# Patient Record
Sex: MALE | Race: WHITE | NOT HISPANIC OR LATINO | Employment: PART TIME | URBAN - METROPOLITAN AREA
[De-identification: names, ages, dates, MRNs, and addresses within clinical notes are randomized per-mention and may not be internally consistent; named-entity substitution may affect disease eponyms.]

---

## 2019-03-02 ENCOUNTER — HOSPITAL ENCOUNTER (INPATIENT)
Facility: MEDICAL CENTER | Age: 59
LOS: 2 days | DRG: 189 | End: 2019-03-04
Attending: EMERGENCY MEDICINE | Admitting: INTERNAL MEDICINE
Payer: MEDICAID

## 2019-03-02 ENCOUNTER — APPOINTMENT (OUTPATIENT)
Dept: RADIOLOGY | Facility: MEDICAL CENTER | Age: 59
DRG: 189 | End: 2019-03-02
Attending: STUDENT IN AN ORGANIZED HEALTH CARE EDUCATION/TRAINING PROGRAM
Payer: MEDICAID

## 2019-03-02 ENCOUNTER — APPOINTMENT (OUTPATIENT)
Dept: RADIOLOGY | Facility: MEDICAL CENTER | Age: 59
DRG: 189 | End: 2019-03-02
Attending: EMERGENCY MEDICINE
Payer: MEDICAID

## 2019-03-02 DIAGNOSIS — R06.00 DYSPNEA, UNSPECIFIED TYPE: ICD-10-CM

## 2019-03-02 DIAGNOSIS — R09.02 HYPOXIA: ICD-10-CM

## 2019-03-02 PROBLEM — T78.3XXA ANGIOEDEMA: Status: ACTIVE | Noted: 2019-03-02

## 2019-03-02 PROBLEM — J96.01 ACUTE RESPIRATORY FAILURE WITH HYPOXIA (HCC): Status: ACTIVE | Noted: 2019-03-02

## 2019-03-02 PROBLEM — I10 HYPERTENSION: Status: ACTIVE | Noted: 2019-03-02

## 2019-03-02 LAB
ALBUMIN SERPL BCP-MCNC: 4.7 G/DL (ref 3.2–4.9)
ALBUMIN/GLOB SERPL: 1.5 G/DL
ALP SERPL-CCNC: 77 U/L (ref 30–99)
ALT SERPL-CCNC: 29 U/L (ref 2–50)
ANION GAP SERPL CALC-SCNC: 9 MMOL/L (ref 0–11.9)
AST SERPL-CCNC: 16 U/L (ref 12–45)
BASOPHILS # BLD AUTO: 0.5 % (ref 0–1.8)
BASOPHILS # BLD: 0.08 K/UL (ref 0–0.12)
BILIRUB SERPL-MCNC: 0.7 MG/DL (ref 0.1–1.5)
BNP SERPL-MCNC: 3 PG/ML (ref 0–100)
BUN SERPL-MCNC: 19 MG/DL (ref 8–22)
C4 SERPL-MCNC: 35 MG/DL (ref 19–52)
CALCIUM SERPL-MCNC: 10.2 MG/DL (ref 8.5–10.5)
CHLORIDE SERPL-SCNC: 101 MMOL/L (ref 96–112)
CO2 SERPL-SCNC: 26 MMOL/L (ref 20–33)
CREAT SERPL-MCNC: 0.93 MG/DL (ref 0.5–1.4)
CRP SERPL HS-MCNC: 1.15 MG/DL (ref 0–0.75)
EKG IMPRESSION: NORMAL
EOSINOPHIL # BLD AUTO: 0.29 K/UL (ref 0–0.51)
EOSINOPHIL NFR BLD: 2 % (ref 0–6.9)
ERYTHROCYTE [DISTWIDTH] IN BLOOD BY AUTOMATED COUNT: 42.5 FL (ref 35.9–50)
ERYTHROCYTE [SEDIMENTATION RATE] IN BLOOD BY WESTERGREN METHOD: 19 MM/HOUR (ref 0–20)
FLUAV RNA SPEC QL NAA+PROBE: NEGATIVE
FLUBV RNA SPEC QL NAA+PROBE: NEGATIVE
GLOBULIN SER CALC-MCNC: 3.2 G/DL (ref 1.9–3.5)
GLUCOSE SERPL-MCNC: 114 MG/DL (ref 65–99)
HCT VFR BLD AUTO: 44.5 % (ref 42–52)
HGB BLD-MCNC: 15.7 G/DL (ref 14–18)
IMM GRANULOCYTES # BLD AUTO: 0.06 K/UL (ref 0–0.11)
IMM GRANULOCYTES NFR BLD AUTO: 0.4 % (ref 0–0.9)
LYMPHOCYTES # BLD AUTO: 2.11 K/UL (ref 1–4.8)
LYMPHOCYTES NFR BLD: 14.5 % (ref 22–41)
MCH RBC QN AUTO: 33.2 PG (ref 27–33)
MCHC RBC AUTO-ENTMCNC: 35.3 G/DL (ref 33.7–35.3)
MCV RBC AUTO: 94.1 FL (ref 81.4–97.8)
MONOCYTES # BLD AUTO: 0.88 K/UL (ref 0–0.85)
MONOCYTES NFR BLD AUTO: 6 % (ref 0–13.4)
NEUTROPHILS # BLD AUTO: 11.16 K/UL (ref 1.82–7.42)
NEUTROPHILS NFR BLD: 76.6 % (ref 44–72)
NRBC # BLD AUTO: 0 K/UL
NRBC BLD-RTO: 0 /100 WBC
PLATELET # BLD AUTO: 211 K/UL (ref 164–446)
PMV BLD AUTO: 10.2 FL (ref 9–12.9)
POTASSIUM SERPL-SCNC: 4 MMOL/L (ref 3.6–5.5)
PROCALCITONIN SERPL-MCNC: <0.05 NG/ML
PROT SERPL-MCNC: 7.9 G/DL (ref 6–8.2)
RBC # BLD AUTO: 4.73 M/UL (ref 4.7–6.1)
SODIUM SERPL-SCNC: 136 MMOL/L (ref 135–145)
TROPONIN I SERPL-MCNC: <0.01 NG/ML (ref 0–0.04)
TSH SERPL DL<=0.005 MIU/L-ACNC: 2.05 UIU/ML (ref 0.38–5.33)
WBC # BLD AUTO: 14.6 K/UL (ref 4.8–10.8)

## 2019-03-02 PROCEDURE — 700111 HCHG RX REV CODE 636 W/ 250 OVERRIDE (IP): Performed by: STUDENT IN AN ORGANIZED HEALTH CARE EDUCATION/TRAINING PROGRAM

## 2019-03-02 PROCEDURE — 99285 EMERGENCY DEPT VISIT HI MDM: CPT

## 2019-03-02 PROCEDURE — 302128 INFUSION PUMP: Performed by: EMERGENCY MEDICINE

## 2019-03-02 PROCEDURE — 36415 COLL VENOUS BLD VENIPUNCTURE: CPT

## 2019-03-02 PROCEDURE — 770020 HCHG ROOM/CARE - TELE (206)

## 2019-03-02 PROCEDURE — 86160 COMPLEMENT ANTIGEN: CPT

## 2019-03-02 PROCEDURE — 70490 CT SOFT TISSUE NECK W/O DYE: CPT

## 2019-03-02 PROCEDURE — 94640 AIRWAY INHALATION TREATMENT: CPT

## 2019-03-02 PROCEDURE — 700105 HCHG RX REV CODE 258: Performed by: STUDENT IN AN ORGANIZED HEALTH CARE EDUCATION/TRAINING PROGRAM

## 2019-03-02 PROCEDURE — 83880 ASSAY OF NATRIURETIC PEPTIDE: CPT

## 2019-03-02 PROCEDURE — 94760 N-INVAS EAR/PLS OXIMETRY 1: CPT

## 2019-03-02 PROCEDURE — 93005 ELECTROCARDIOGRAM TRACING: CPT

## 2019-03-02 PROCEDURE — 84145 PROCALCITONIN (PCT): CPT

## 2019-03-02 PROCEDURE — 700102 HCHG RX REV CODE 250 W/ 637 OVERRIDE(OP): Performed by: STUDENT IN AN ORGANIZED HEALTH CARE EDUCATION/TRAINING PROGRAM

## 2019-03-02 PROCEDURE — 96374 THER/PROPH/DIAG INJ IV PUSH: CPT

## 2019-03-02 PROCEDURE — 84484 ASSAY OF TROPONIN QUANT: CPT

## 2019-03-02 PROCEDURE — 71275 CT ANGIOGRAPHY CHEST: CPT

## 2019-03-02 PROCEDURE — 87502 INFLUENZA DNA AMP PROBE: CPT

## 2019-03-02 PROCEDURE — 304561 HCHG STAT O2

## 2019-03-02 PROCEDURE — 71045 X-RAY EXAM CHEST 1 VIEW: CPT

## 2019-03-02 PROCEDURE — 84443 ASSAY THYROID STIM HORMONE: CPT

## 2019-03-02 PROCEDURE — 93005 ELECTROCARDIOGRAM TRACING: CPT | Performed by: EMERGENCY MEDICINE

## 2019-03-02 PROCEDURE — 700117 HCHG RX CONTRAST REV CODE 255: Performed by: STUDENT IN AN ORGANIZED HEALTH CARE EDUCATION/TRAINING PROGRAM

## 2019-03-02 PROCEDURE — 302128 INFUSION PUMP

## 2019-03-02 PROCEDURE — A9270 NON-COVERED ITEM OR SERVICE: HCPCS | Performed by: STUDENT IN AN ORGANIZED HEALTH CARE EDUCATION/TRAINING PROGRAM

## 2019-03-02 PROCEDURE — 700111 HCHG RX REV CODE 636 W/ 250 OVERRIDE (IP): Performed by: EMERGENCY MEDICINE

## 2019-03-02 PROCEDURE — 85652 RBC SED RATE AUTOMATED: CPT

## 2019-03-02 PROCEDURE — 82103 ALPHA-1-ANTITRYPSIN TOTAL: CPT

## 2019-03-02 PROCEDURE — 86140 C-REACTIVE PROTEIN: CPT

## 2019-03-02 PROCEDURE — 700101 HCHG RX REV CODE 250: Performed by: STUDENT IN AN ORGANIZED HEALTH CARE EDUCATION/TRAINING PROGRAM

## 2019-03-02 PROCEDURE — 80053 COMPREHEN METABOLIC PANEL: CPT

## 2019-03-02 PROCEDURE — 700101 HCHG RX REV CODE 250: Performed by: EMERGENCY MEDICINE

## 2019-03-02 PROCEDURE — 85025 COMPLETE CBC W/AUTO DIFF WBC: CPT

## 2019-03-02 RX ORDER — FAMOTIDINE 20 MG/1
10 TABLET, FILM COATED ORAL ONCE
Status: COMPLETED | OUTPATIENT
Start: 2019-03-02 | End: 2019-03-02

## 2019-03-02 RX ORDER — LOSARTAN POTASSIUM 25 MG/1
25 TABLET ORAL DAILY
Status: ON HOLD | COMMUNITY
End: 2019-03-04

## 2019-03-02 RX ORDER — IPRATROPIUM BROMIDE AND ALBUTEROL SULFATE 2.5; .5 MG/3ML; MG/3ML
3 SOLUTION RESPIRATORY (INHALATION)
Status: DISCONTINUED | OUTPATIENT
Start: 2019-03-02 | End: 2019-03-03

## 2019-03-02 RX ORDER — METHYLPREDNISOLONE SODIUM SUCCINATE 125 MG/2ML
125 INJECTION, POWDER, LYOPHILIZED, FOR SOLUTION INTRAMUSCULAR; INTRAVENOUS EVERY 8 HOURS
Status: DISCONTINUED | OUTPATIENT
Start: 2019-03-02 | End: 2019-03-04

## 2019-03-02 RX ORDER — CETIRIZINE HYDROCHLORIDE 10 MG/1
10 TABLET ORAL ONCE
Status: COMPLETED | OUTPATIENT
Start: 2019-03-02 | End: 2019-03-02

## 2019-03-02 RX ORDER — METHYLPREDNISOLONE SODIUM SUCCINATE 125 MG/2ML
62.5 INJECTION, POWDER, LYOPHILIZED, FOR SOLUTION INTRAMUSCULAR; INTRAVENOUS DAILY
Status: DISCONTINUED | OUTPATIENT
Start: 2019-03-03 | End: 2019-03-02

## 2019-03-02 RX ORDER — RANITIDINE 15 MG/ML
75 SOLUTION ORAL ONCE
Status: DISCONTINUED | OUTPATIENT
Start: 2019-03-02 | End: 2019-03-02

## 2019-03-02 RX ORDER — ACETAMINOPHEN 325 MG/1
650 TABLET ORAL EVERY 6 HOURS PRN
Status: DISCONTINUED | OUTPATIENT
Start: 2019-03-02 | End: 2019-03-04 | Stop reason: HOSPADM

## 2019-03-02 RX ORDER — AMOXICILLIN 250 MG
2 CAPSULE ORAL 2 TIMES DAILY
Status: DISCONTINUED | OUTPATIENT
Start: 2019-03-02 | End: 2019-03-04 | Stop reason: HOSPADM

## 2019-03-02 RX ORDER — ENALAPRILAT 1.25 MG/ML
1.25 INJECTION INTRAVENOUS EVERY 6 HOURS PRN
Status: DISCONTINUED | OUTPATIENT
Start: 2019-03-02 | End: 2019-03-02

## 2019-03-02 RX ORDER — CETIRIZINE HYDROCHLORIDE 10 MG/1
10 TABLET ORAL DAILY
Status: DISCONTINUED | OUTPATIENT
Start: 2019-03-02 | End: 2019-03-02

## 2019-03-02 RX ORDER — POLYETHYLENE GLYCOL 3350 17 G/17G
1 POWDER, FOR SOLUTION ORAL
Status: DISCONTINUED | OUTPATIENT
Start: 2019-03-02 | End: 2019-03-04 | Stop reason: HOSPADM

## 2019-03-02 RX ORDER — SODIUM CHLORIDE 9 MG/ML
INJECTION, SOLUTION INTRAVENOUS CONTINUOUS
Status: DISCONTINUED | OUTPATIENT
Start: 2019-03-02 | End: 2019-03-04 | Stop reason: HOSPADM

## 2019-03-02 RX ORDER — BISACODYL 10 MG
10 SUPPOSITORY, RECTAL RECTAL
Status: DISCONTINUED | OUTPATIENT
Start: 2019-03-02 | End: 2019-03-04 | Stop reason: HOSPADM

## 2019-03-02 RX ORDER — METHYLPREDNISOLONE SODIUM SUCCINATE 125 MG/2ML
125 INJECTION, POWDER, LYOPHILIZED, FOR SOLUTION INTRAMUSCULAR; INTRAVENOUS ONCE
Status: COMPLETED | OUTPATIENT
Start: 2019-03-02 | End: 2019-03-02

## 2019-03-02 RX ORDER — HYDRALAZINE HYDROCHLORIDE 20 MG/ML
10 INJECTION INTRAMUSCULAR; INTRAVENOUS EVERY 4 HOURS PRN
Status: DISCONTINUED | OUTPATIENT
Start: 2019-03-02 | End: 2019-03-04 | Stop reason: HOSPADM

## 2019-03-02 RX ADMIN — CETIRIZINE HYDROCHLORIDE 10 MG: 10 TABLET, FILM COATED ORAL at 14:37

## 2019-03-02 RX ADMIN — METHYLPREDNISOLONE SODIUM SUCCINATE 125 MG: 125 INJECTION, POWDER, FOR SOLUTION INTRAMUSCULAR; INTRAVENOUS at 09:50

## 2019-03-02 RX ADMIN — IOHEXOL 60 ML: 350 INJECTION, SOLUTION INTRAVENOUS at 14:20

## 2019-03-02 RX ADMIN — IPRATROPIUM BROMIDE AND ALBUTEROL SULFATE 3 ML: .5; 3 SOLUTION RESPIRATORY (INHALATION) at 23:04

## 2019-03-02 RX ADMIN — ENOXAPARIN SODIUM 40 MG: 100 INJECTION SUBCUTANEOUS at 17:30

## 2019-03-02 RX ADMIN — METHYLPREDNISOLONE SODIUM SUCCINATE 125 MG: 125 INJECTION, POWDER, FOR SOLUTION INTRAMUSCULAR; INTRAVENOUS at 17:29

## 2019-03-02 RX ADMIN — SODIUM CHLORIDE 1000 ML: 9 INJECTION, SOLUTION INTRAVENOUS at 17:34

## 2019-03-02 RX ADMIN — IPRATROPIUM BROMIDE 0.5 MG: 0.5 SOLUTION RESPIRATORY (INHALATION) at 10:00

## 2019-03-02 RX ADMIN — FAMOTIDINE 10 MG: 20 TABLET ORAL at 17:30

## 2019-03-02 RX ADMIN — ALBUTEROL SULFATE 2.5 MG: 2.5 SOLUTION RESPIRATORY (INHALATION) at 10:00

## 2019-03-02 RX ADMIN — RACEPINEPHRINE HYDROCHLORIDE 0.5 ML: 11.25 SOLUTION RESPIRATORY (INHALATION) at 19:34

## 2019-03-02 ASSESSMENT — ENCOUNTER SYMPTOMS
FEVER: 0
NAUSEA: 0
COUGH: 1
HEARTBURN: 0
ABDOMINAL PAIN: 0
DIZZINESS: 0
BACK PAIN: 0
VOMITING: 0
CHILLS: 0
ORTHOPNEA: 1
BLURRED VISION: 0
WHEEZING: 0
HEADACHES: 0
SHORTNESS OF BREATH: 1
SORE THROAT: 0
DIARRHEA: 0
PALPITATIONS: 0
DOUBLE VISION: 0

## 2019-03-02 ASSESSMENT — COGNITIVE AND FUNCTIONAL STATUS - GENERAL
MOBILITY SCORE: 24
SUGGESTED CMS G CODE MODIFIER MOBILITY: CH
DAILY ACTIVITIY SCORE: 24
SUGGESTED CMS G CODE MODIFIER DAILY ACTIVITY: CH

## 2019-03-02 ASSESSMENT — COPD QUESTIONNAIRES
DO YOU EVER COUGH UP ANY MUCUS OR PHLEGM?: NO/ONLY WITH OCCASIONAL COLDS OR INFECTIONS
DO YOU EVER COUGH UP ANY MUCUS OR PHLEGM?: NO/ONLY WITH OCCASIONAL COLDS OR INFECTIONS
DURING THE PAST 4 WEEKS HOW MUCH DID YOU FEEL SHORT OF BREATH: NONE/LITTLE OF THE TIME
HAVE YOU SMOKED AT LEAST 100 CIGARETTES IN YOUR ENTIRE LIFE: NO/DON'T KNOW
COPD SCREENING SCORE: 1
HAVE YOU SMOKED AT LEAST 100 CIGARETTES IN YOUR ENTIRE LIFE: NO/DON'T KNOW
IN THE PAST 12 MONTHS DO YOU DO LESS THAN YOU USED TO BECAUSE OF YOUR BREATHING PROBLEMS: DISAGREE/UNSURE
DURING THE PAST 4 WEEKS HOW MUCH DID YOU FEEL SHORT OF BREATH: NONE/LITTLE OF THE TIME
COPD SCREENING SCORE: 1

## 2019-03-02 ASSESSMENT — PATIENT HEALTH QUESTIONNAIRE - PHQ9
SUM OF ALL RESPONSES TO PHQ9 QUESTIONS 1 AND 2: 0
1. LITTLE INTEREST OR PLEASURE IN DOING THINGS: NOT AT ALL
2. FEELING DOWN, DEPRESSED, IRRITABLE, OR HOPELESS: NOT AT ALL

## 2019-03-02 ASSESSMENT — LIFESTYLE VARIABLES
EVER_SMOKED: YES
ALCOHOL_USE: NO
EVER_SMOKED: YES

## 2019-03-02 NOTE — ED TRIAGE NOTES
Chief Complaint   Patient presents with   • Shortness of Breath     and nonproductive cough starting last night     SpO2 86% in triage. Denies fever/chills/bodyaches or nausea. Recently switched from lisinopril to losartan and took first dose yesterday evening.

## 2019-03-02 NOTE — ASSESSMENT & PLAN NOTE
Blood pressure has been stable in the 120s systolic  At home, patient is in the Stage 2 to Stage 3 Range of Hypertension  Continue to monitor while inpatient.  Avoid ACE Inhibitors or ARBs for now.   Follow up outpatient with PCP for further chronic blood pressure management. May consider calcium channel blockers or thiazide diuretics.

## 2019-03-02 NOTE — ED NOTES
Med rec complete per pt at bedside   Allergies reviewed - patient states he think Lisinopril was causing him to cough but is unsure , got switched to Losartan last night (03/01/19)  No oral ABX within last 30 days

## 2019-03-02 NOTE — ED NOTES
Patient is awake and alert, responding to questions appropriately, patient states he was switched from Lisinopril to HCTZ and Losartan, started taking these medications yesterday and since then has been very short of breath, states he has dyspnea at rest as well as on exertion, states he is unable to lay down with out feeling very short of breath, states he has had a productive cough with white/clear sputum, hypoxic on RA with o2 sat of 87, denies chest pain, denies abdominal pain or NVD, denies urinary symptoms, negative for swelling in his lower extremities .Walked to ED room in [facility] with steady gait however patient was on 4 liters oxygen.

## 2019-03-02 NOTE — ED PROVIDER NOTES
"ED Provider Note    ED Provider Note    Primary care provider: Damien Perales D.O.  Means of arrival: POV   History obtained from: Patient  History limited by: None    CHIEF COMPLAINT  Chief Complaint   Patient presents with   • Shortness of Breath     and nonproductive cough starting last night       HPI  Asad Estevez is a 58 y.o. male who presents to the Emergency Department With a chief complaint of shortness of breath.  Patient states he originated with a cough after he was started on lisinopril few weeks ago.  Cough got progressively worse.  He follow-up with his primary care doctor yesterday who discontinued the lisinopril.  Apparently, his oxygen saturation was checked in the office and it was low approximately 88 which is concerning for his PCP.  This prompted him to be sent for an outpatient chest x-ray as well as lab work.  Patient was then prescribed losartan.  He took the first dose last night.  He went to lay down and again feeling short of breath.  He felt like perhaps his throat was closing up.  He could not lay flat this made his symptoms worse.  He continued to have a cough.  He states it was productive but only of saliva.  He went out to the living room in order to not bother his wife but he still could not lay flat.  Symptoms worsen which is very concerning for him.  This prompted his coming to the ED for evaluation.  He denies any lower extremity edema now or in the recent past.  He denies pain of any kind.  No headache.  No chest pain.  No back pain.  No fever.  No sore throat or ear pain.  He was told yesterday, that his x-ray was \"clear\" he does not have results of the lab work done.  Patient's never had similar symptoms in the past.  His treatment for hypertension only started a few weeks ago.  Patient denies any known recent exposures to chemicals.  He works for [employer].  He has no history of thromboembolic disease.  His father  of hemochromatosis.    REVIEW OF SYSTEMS  Review " "of Systems   Constitutional: Negative for chills and fever.   HENT: Negative for congestion and sore throat.    Respiratory: Positive for cough and shortness of breath.    Cardiovascular: Positive for orthopnea. Negative for chest pain, palpitations and leg swelling.   Gastrointestinal: Negative for abdominal pain, diarrhea, nausea and vomiting.   Genitourinary: Negative for dysuria.   Musculoskeletal: Negative for back pain.   Neurological: Negative for headaches.   All other systems reviewed and are negative.      PAST MEDICAL HISTORY   has a past medical history of Hypertension.    SURGICAL HISTORY   has a past surgical history that includes other abdominal surgery.    SOCIAL HISTORY  Social History   Substance Use Topics   • Smoking status: Former Smoker     Packs/day: 0.50     Years: 25.00     Types: Cigarettes   • Smokeless tobacco: Never Used      Comment: 15 yrs. 1/2 ppd   • Alcohol use Yes      Comment: few beers a week      History   Drug Use No       FAMILY HISTORY  History reviewed. No pertinent family history.    CURRENT MEDICATIONS  Home Medications     Reviewed by Lucia Quiroz, Pharmacy Intern (Pharmacy Intern) on 03/02/19 at 1106  Med List Status: Complete   Medication Last Dose Status   losartan (COZAAR) 25 MG Tab 3/1/2019 Active                ALLERGIES  Allergies   Allergen Reactions   • Nkda [No Known Drug Allergy]        PHYSICAL EXAM  VITAL SIGNS: /85   Pulse 95   Temp 36.3 °C (97.3 °F) (Temporal)   Resp 14   Ht 1.854 m (6' 1\")   Wt (!) 134.6 kg (296 lb 11.8 oz)   SpO2 93%   BMI 39.15 kg/m²   Vitals reviewed.  Constitutional: Patient is oriented to person, place, and time. Appears well-developed and well-nourished. No distress.  Obese male.  Head: Normocephalic and atraumatic.   Ears: Normal external ears bilaterally.   Mouth/Throat: Oropharynx is clear and moist, no exudates.   Eyes: Conjunctivae are normal. Pupils are equal, round, and reactive to light.   Neck: Normal range " of motion. Neck supple.  Cardiovascular: Normal rate, regular rhythm and normal heart sounds. Normal peripheral pulses.  Pulmonary/Chest: Diminished breath sounds.  Effort normal and breath sounds normal. No respiratory distress, no wheezes, rhonchi, or rales. No chest wall tenderness.  Abdominal: Soft. Bowel sounds are normal. There is no tenderness. No rebound or guarding, or peritoneal signs.   Musculoskeletal: No edema and no tenderness.   Lymphadenopathy: No cervical adenopathy.   Neurological: No focal deficits.   Skin: Skin is warm and dry. No erythema. No pallor.   Psychiatric: Patient has a normal mood and affect.     LABS  Results for orders placed or performed during the hospital encounter of 03/02/19   CBC WITH DIFFERENTIAL   Result Value Ref Range    WBC 14.6 (H) 4.8 - 10.8 K/uL    RBC 4.73 4.70 - 6.10 M/uL    Hemoglobin 15.7 14.0 - 18.0 g/dL    Hematocrit 44.5 42.0 - 52.0 %    MCV 94.1 81.4 - 97.8 fL    MCH 33.2 (H) 27.0 - 33.0 pg    MCHC 35.3 33.7 - 35.3 g/dL    RDW 42.5 35.9 - 50.0 fL    Platelet Count 211 164 - 446 K/uL    MPV 10.2 9.0 - 12.9 fL    Neutrophils-Polys 76.60 (H) 44.00 - 72.00 %    Lymphocytes 14.50 (L) 22.00 - 41.00 %    Monocytes 6.00 0.00 - 13.40 %    Eosinophils 2.00 0.00 - 6.90 %    Basophils 0.50 0.00 - 1.80 %    Immature Granulocytes 0.40 0.00 - 0.90 %    Nucleated RBC 0.00 /100 WBC    Neutrophils (Absolute) 11.16 (H) 1.82 - 7.42 K/uL    Lymphs (Absolute) 2.11 1.00 - 4.80 K/uL    Monos (Absolute) 0.88 (H) 0.00 - 0.85 K/uL    Eos (Absolute) 0.29 0.00 - 0.51 K/uL    Baso (Absolute) 0.08 0.00 - 0.12 K/uL    Immature Granulocytes (abs) 0.06 0.00 - 0.11 K/uL    NRBC (Absolute) 0.00 K/uL   COMP METABOLIC PANEL   Result Value Ref Range    Sodium 136 135 - 145 mmol/L    Potassium 4.0 3.6 - 5.5 mmol/L    Chloride 101 96 - 112 mmol/L    Co2 26 20 - 33 mmol/L    Anion Gap 9.0 0.0 - 11.9    Glucose 114 (H) 65 - 99 mg/dL    Bun 19 8 - 22 mg/dL    Creatinine 0.93 0.50 - 1.40 mg/dL    Calcium  10.2 8.5 - 10.5 mg/dL    AST(SGOT) 16 12 - 45 U/L    ALT(SGPT) 29 2 - 50 U/L    Alkaline Phosphatase 77 30 - 99 U/L    Total Bilirubin 0.7 0.1 - 1.5 mg/dL    Albumin 4.7 3.2 - 4.9 g/dL    Total Protein 7.9 6.0 - 8.2 g/dL    Globulin 3.2 1.9 - 3.5 g/dL    A-G Ratio 1.5 g/dL   TROPONIN   Result Value Ref Range    Troponin I <0.01 0.00 - 0.04 ng/mL   BNP   Result Value Ref Range    B Natriuretic Peptide 3 0 - 100 pg/mL   ESTIMATED GFR   Result Value Ref Range    GFR If African American >60 >60 mL/min/1.73 m 2    GFR If Non African American >60 >60 mL/min/1.73 m 2   EKG   Result Value Ref Range    Report       [facility] Emergency Dept.    Test Date:  2019  Pt Name:    MELANIE HANSEN              Department: ER  MRN:        [id number]                      Room:  Gender:     Male                         Technician: [id number]  :        1960                   Requested By:ER TRIAGE PROTOCOL  Order #:    [id number]                    Reading MD:    Measurements  Intervals                                Axis  Rate:       86                           P:          60  CT:         168                          QRS:        43  QRSD:       88                           T:          60  QT:         380  QTc:        455    Interpretive Statements  SINUS RHYTHM  BORDERLINE LOW VOLTAGE IN FRONTAL LEADS  Compared to ECG 2013 09:47:29  No significant changes         All labs reviewed by me.    EKG Interpretation  Interpreted by me    Rhythm: normal sinus   Rate: normal  Axis: normal  Ectopy: none  Conduction: normal  ST Segments: no acute change  T Waves: no acute change  Q Waves: none    Clinical Impression: no acute changes and normal EKG    RADIOLOGY  DX-CHEST-PORTABLE (1 VIEW)   Final Result         1. No acute cardiopulmonary abnormalities are identified.      EC-ECHOCARDIOGRAM COMPLETE W/O CONT    (Results Pending)   CT-CTA CHEST PULMONARY ARTERY W/ RECONS    (Results Pending)   CT-SOFT TISSUE NECK  W/O    (Results Pending)     The radiologist's interpretation of all radiological studies have been reviewed by me.    COURSE & MEDICAL DECISION MAKING  Pertinent Labs & Imaging studies reviewed. (See chart for details)    9:05 AM - Patient seen and examined at bedside.  This is a pleasant and previously healthy other than recent hypertension, 58-year-old male who presents with shortness of breath.  I did turn off his supplemental oxygen during my exam and he did desaturate, to approximately 87% before I resumed the oxygen flow.  Symptoms mostly consist of shortness of breath, worsened with laying flat.  He has no pain.  Differential includes possible allergic reaction to losartan, pneumonitis, pneumonia, less likely pulmonary embolism or CHF.  He does not have lower extremity swelling.  No rales on exam.  IV started labs drawn per nursing protocols.  I suggested to the patient, will begin investigation here in the ED however, unless there is a marked change in his oxygen saturation he will need admission to the hospital.    10:58 AM R internal medicine, page says it appears, [facility].    11:25 AM VALDEMAR youssef    Discussed with [facility] internal medicine resident who agrees to admit the patient to their service.    11:42 AM, patient's reevaluated at the bedside.  He still hypoxic on room air.  No significant change in condition.  Of advised admission to the hospital for further evaluation to which she is agreeable.  I still suspect, that this may be related to the losartan.  Workup overall unrevealing.    Patient is admitted in stable condition.    FINAL IMPRESSION  1. Hypoxia    2. Dyspnea, unspecified type

## 2019-03-02 NOTE — H&P
Internal Medicine Admitting History and Physical    Note Author: Ilya Victoria D.O.       Name Asad Estevez     1960   Age/Sex 58 y.o. male   MRN [id number]   Code Status FULL     After 5PM or if no immediate response to page, please call for cross-coverage  Attending/Team: Dr. Gonzalez, [employer] below See Patient List for primary contact information  Call [phone] to page    1st Call - Day Intern (R1):   Dr. Victoria 2nd Call - Day Sr. Resident (R2/R3):   Dr. Pearce       Chief Complaint:   Shortness of Breath and Throat Swelling    HPI:         The patient is a 58-year-old male with a past medical history of hypertension who presented to the hospital with shortness of breath and throat swelling. The patient had recently been started on lisinopril 2 months ago and began to have a nagging cough.  States that when he first started on lisinopril he did feel minimal throat swelling for the first few days he was on the medication.  He reported to his doctor's office 3 days ago and was changed from lisinopril to losartan due to his symptoms of nagging cough shortness of breath.  While he was at his doctor's office, he was found to be hypoxic on room air.  He had his first dose of losartan one night ago.  About 1/2-hour after taking the losartan, the patient reports that that his neck began to swell on the inside and he had difficulty breathing.  He had difficulty laying down flat and breathing.  Patient felt that he had some fluid also building up in the back of his throat when he would lay down flat.  The patient denies any fever, chills, or recent sick contacts.  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  Patient denies any recent travel.  Reports that he did have shrimp about 3 hours before taking the lisinopril.  However, he denies any history of allergies to shellfish. The patient denies any chest pain or  palpitations.          In the emergency department, the patient had a checks x-ray which was essentially normal.  Vital signs were as follows: Temperature 36.3, pulse 91, respiratory rate 8 blood pressure 138/84, O2 sat 92% on 4 L nasal cannula.  Troponin was less than 0.01 BNP was 3.    Review of Systems   Constitutional: Negative for chills and fever.   HENT: Negative for hearing loss and tinnitus.    Eyes: Negative for blurred vision and double vision.   Respiratory: Positive for cough and shortness of breath. Negative for wheezing.    Cardiovascular: Negative for chest pain and palpitations.   Gastrointestinal: Negative for abdominal pain, diarrhea, heartburn, nausea and vomiting.   Genitourinary: Negative for dysuria and urgency.   Musculoskeletal: Negative for back pain.   Skin: Negative for rash.   Neurological: Negative for dizziness and headaches.             Past Medical History (Chronic medical problem, known complications and current treatment)    Hypertension, started on lisinopril 2 months ago.  Started losartan last night.  Denies any cardiac or respiratory medical history.  Denies any history of cancer.    Past Surgical History:  Past Surgical History:   Procedure Laterality Date   • OTHER ABDOMINAL SURGERY      hernia repair       Current Outpatient Medications:  Home Medications     Reviewed by Lucia Quiroz, Pharmacy Intern (Pharmacy Intern) on 03/02/19 at 1106  Med List Status: Complete   Medication Last Dose Status   losartan (COZAAR) 25 MG Tab 3/1/2019 Active                Medication Allergy/Sensitivities:  Allergies   Allergen Reactions   • Lisinopril      Swelling of the throat   • Losartan      Severe swelling of the throat.   • Nkda [No Known Drug Allergy]          Family History (mandatory)   Father: Hemochromatosis  Denies family history of lung disease.    Social History (mandatory)   Social History     Social History   • Marital status:      Spouse name: N/A   • Number of  "children: N/A   • Years of education: N/A     Occupational History   • Not on file.     Social History Main Topics   • Smoking status: Former Smoker     Packs/day: 0.50     Years: 25.00     Types: Cigarettes   • Smokeless tobacco: Never Used      Comment: 15 yrs. 1/2 ppd   • Alcohol use Yes      Comment: few beers a week   • Drug use: No   • Sexual activity: Not on file     Other Topics Concern   • Not on file     Social History Narrative   • No narrative on file     Living situation: Lives with his wife and daughter in [place].  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  He is able to accomplish ADLs normally.  PCP : Damien Perales D.O.    Physical Exam     Vitals:    03/02/19 1400 03/02/19 1401 03/02/19 1430 03/02/19 1545   BP:    137/91   Pulse: 87 87 84 88   Resp:    17   Temp:    36.6 °C (97.8 °F)   TempSrc:    Temporal   SpO2: 90% 89% 92% 92%   Weight:    (!) 134.4 kg (296 lb 4.8 oz)   Height:         Body mass index is 39.09 kg/m².  /91   Pulse 88   Temp 36.6 °C (97.8 °F) (Temporal)   Resp 17   Ht 1.854 m (6' 1\")   Wt (!) 134.4 kg (296 lb 4.8 oz)   SpO2 92%   BMI 39.09 kg/m²   O2 therapy: Pulse Oximetry: 92 %, O2 (LPM): 4, O2 Delivery: Silicone Nasal Cannula    Physical Exam   Constitutional: He is oriented to person, place, and time and well-developed, well-nourished, and in no distress. No distress.   HENT:   Head: Normocephalic.   Eyes: Pupils are equal, round, and reactive to light. Conjunctivae and EOM are normal. Right eye exhibits no discharge. Left eye exhibits no discharge.   Neck: Neck supple. No JVD present. No tracheal deviation present. No thyromegaly present.   Cardiovascular: Normal rate, regular rhythm and normal heart sounds.  Exam reveals no gallop and no friction rub.    No murmur heard.  Pulmonary/Chest: No stridor. He is in respiratory distress. He has no wheezes.   Crackles posteriorly bilateral in the lower lobes   Abdominal: Soft. Bowel sounds are normal. He " exhibits no distension. There is no tenderness. There is no rebound and no guarding.   Genitourinary:   Genitourinary Comments: Rectal declined by patient   Musculoskeletal: He exhibits no edema, tenderness or deformity.   Neurological: He is alert and oriented to person, place, and time. He displays normal reflexes. No cranial nerve deficit. He exhibits normal muscle tone. Coordination normal.   Skin: Skin is dry. No rash noted. He is not diaphoretic. No erythema. No pallor.   Psychiatric: Mood, affect and judgment normal.   Nursing note and vitals reviewed.      Data Review       Old Records Request:   Completed  Current Records review/summary: Completed    Lab Data Review:  Recent Results (from the past 24 hour(s))   EKG    Collection Time: 19  8:25 AM   Result Value Ref Range    Report       [facility]    Test Date:  2019  Pt Name:    MELANIE HANSEN              Department: ER  MRN:        [id number]                      Room:  Gender:     Male                         Technician: [id number]  :        1960                   Requested By:ER TRIAGE PROTOCOL  Order #:    [id number]                    Reading MD:    Measurements  Intervals                                Axis  Rate:       86                           P:          60  IA:         168                          QRS:        43  QRSD:       88                           T:          60  QT:         380  QTc:        455    Interpretive Statements  SINUS RHYTHM  BORDERLINE LOW VOLTAGE IN FRONTAL LEADS  Compared to ECG 2013 09:47:29  No significant changes     CBC WITH DIFFERENTIAL    Collection Time: 19  8:50 AM   Result Value Ref Range    WBC 14.6 (H) 4.8 - 10.8 K/uL    RBC 4.73 4.70 - 6.10 M/uL    Hemoglobin 15.7 14.0 - 18.0 g/dL    Hematocrit 44.5 42.0 - 52.0 %    MCV 94.1 81.4 - 97.8 fL    MCH 33.2 (H) 27.0 - 33.0 pg    MCHC 35.3 33.7 - 35.3 g/dL    RDW 42.5 35.9 - 50.0 fL    Platelet Count 211 164  - 446 K/uL    MPV 10.2 9.0 - 12.9 fL    Neutrophils-Polys 76.60 (H) 44.00 - 72.00 %    Lymphocytes 14.50 (L) 22.00 - 41.00 %    Monocytes 6.00 0.00 - 13.40 %    Eosinophils 2.00 0.00 - 6.90 %    Basophils 0.50 0.00 - 1.80 %    Immature Granulocytes 0.40 0.00 - 0.90 %    Nucleated RBC 0.00 /100 WBC    Neutrophils (Absolute) 11.16 (H) 1.82 - 7.42 K/uL    Lymphs (Absolute) 2.11 1.00 - 4.80 K/uL    Monos (Absolute) 0.88 (H) 0.00 - 0.85 K/uL    Eos (Absolute) 0.29 0.00 - 0.51 K/uL    Baso (Absolute) 0.08 0.00 - 0.12 K/uL    Immature Granulocytes (abs) 0.06 0.00 - 0.11 K/uL    NRBC (Absolute) 0.00 K/uL   COMP METABOLIC PANEL    Collection Time: 03/02/19  8:50 AM   Result Value Ref Range    Sodium 136 135 - 145 mmol/L    Potassium 4.0 3.6 - 5.5 mmol/L    Chloride 101 96 - 112 mmol/L    Co2 26 20 - 33 mmol/L    Anion Gap 9.0 0.0 - 11.9    Glucose 114 (H) 65 - 99 mg/dL    Bun 19 8 - 22 mg/dL    Creatinine 0.93 0.50 - 1.40 mg/dL    Calcium 10.2 8.5 - 10.5 mg/dL    AST(SGOT) 16 12 - 45 U/L    ALT(SGPT) 29 2 - 50 U/L    Alkaline Phosphatase 77 30 - 99 U/L    Total Bilirubin 0.7 0.1 - 1.5 mg/dL    Albumin 4.7 3.2 - 4.9 g/dL    Total Protein 7.9 6.0 - 8.2 g/dL    Globulin 3.2 1.9 - 3.5 g/dL    A-G Ratio 1.5 g/dL   TROPONIN    Collection Time: 03/02/19  8:50 AM   Result Value Ref Range    Troponin I <0.01 0.00 - 0.04 ng/mL   BNP    Collection Time: 03/02/19  8:50 AM   Result Value Ref Range    B Natriuretic Peptide 3 0 - 100 pg/mL   ESTIMATED GFR    Collection Time: 03/02/19  8:50 AM   Result Value Ref Range    GFR If African American >60 >60 mL/min/1.73 m 2    GFR If Non African American >60 >60 mL/min/1.73 m 2   PROCALCITONIN    Collection Time: 03/02/19  8:50 AM   Result Value Ref Range    Procalcitonin <0.05 <0.25 ng/mL   TSH WITH REFLEX TO FT4    Collection Time: 03/02/19  8:50 AM   Result Value Ref Range    TSH 2.050 0.380 - 5.330 uIU/mL   CRP QUANTITIVE (NON-CARDIAC)    Collection Time: 03/02/19  8:50 AM   Result Value Ref  Range    Stat C-Reactive Protein 1.15 (H) 0.00 - 0.75 mg/dL   Influenza A/B By PCR (Adult - Flu Only)    Collection Time: 03/02/19 12:53 PM   Result Value Ref Range    Influenza virus A RNA Negative Negative    Influenza virus B, PCR Negative Negative   WESTERGREN SED RATE    Collection Time: 03/02/19  1:14 PM   Result Value Ref Range    Sed Rate Westergren 19 0 - 20 mm/hour       Imaging/Procedures Review:    Independant Imaging Review: Completed  CT-SOFT TISSUE NECK W/O   Final Result         Normal epiglottis. The nasopharynx, oropharynx, and hypopharynx show normal airways and no abnormal soft tissue swelling.      CT-CTA CHEST PULMONARY ARTERY W/ RECONS   Final Result      No evidence of pulmonary embolus.      Collapsed the trachea suggesting tracheomalacia.      Peripheral interstitial prominence may indicate pulmonary edema.            DX-CHEST-PORTABLE (1 VIEW)   Final Result         1. No acute cardiopulmonary abnormalities are identified.      EC-ECHOCARDIOGRAM COMPLETE W/O CONT    (Results Pending)        EKG:   EKG Independent Review: Completed  QTc:455, HR: 86, Normal Sinus Rhythm, no ST/T changes     Records reviewed and summarized in current documentation :  Yes  [employer] teaching service handout given to patient:  No         Assessment/Plan     * Angioedema- (present on admission)   Assessment & Plan    Patient presented with throat swelling after taking losartan last night.  Had difficulty breathing.  Also had eaten shrimp prior to having this reaction.  No known history of seafood allergies.    CT of the neck to evaluate soft tissues  Continue to monitor airway  Avoid ACE inhibitors and ARBs  Sed rate, C4 levels.     Acute respiratory failure with hypoxia (HCC)- (present on admission)   Assessment & Plan    Patient presents with acute hypoxic respiratory failure.  DDX includes infectious vs. Inflammatory causes.  Requiring 4 L of oxygen to saturate in the mid 80s  The patient does not use oxygen at  home  Patient recently started losartan and was found to have swollen neck and difficulty breathing.  Received IV Solu-Medrol in the ED  CT PE study negative for pulmonary embolus    Admit to telemetry  Procalcitonin  Respiratory therapy protocol  Duo nebs  Low threshold for intramuscular epinephrine if the patient's airway starts to swell, Low threshold for transfer to ICU.  Transthoracic echocardiogram       Hypertension- (present on admission)   Assessment & Plan    Continue to monitor  Avoid ACE Inhibitors or ARBs         Anticipated Hospital stay:  >2 midnights        Quality Measures  Quality-Core Measures   Reviewed items::  EKG reviewed, Labs reviewed, Medications reviewed and Radiology images reviewed  De Dios catheter::  No De Dios  DVT prophylaxis pharmacological::  Enoxaparin (Lovenox)  DVT prophylaxis - mechanical:  SCDs    PCP: Damien Perales D.O.

## 2019-03-02 NOTE — ED NOTES
Patient had no difficulty swallowing water or pills, speech is clear, patient is alert and oriented, no facial droop present, patient is not drooling

## 2019-03-02 NOTE — ASSESSMENT & PLAN NOTE
The patient remains hypoxic on 4L of oxygen.  STOP-BANG Score: 7, High risk of ALBERTINA.  The patient is saturating in the mid 90's on room air.  Passed ambulatory oxygen evaluation yesterday.  Underlying ALBERTINA is possibly a component of the patient's hypoxia.  The patient was instructed to follow up with his PCP upon discharge on 3/7/19.  The patient has an outpatient sleep study appointment scheduled for 3/5/19.

## 2019-03-02 NOTE — ASSESSMENT & PLAN NOTE
Resolved.  Patient presented with throat swelling after taking losartan the night prior to admission.  This is more often seen with lisinopril, though the patient had been taking lisinopril 2 months earlier.  CT neck normal-showed tracheal flattening.   C4 normal, CRP elevated.    Continue to monitor airway.  Angioedema should resolve with cessation of ACE inhibitor and ARB.  Avoid ACE inhibitors and ARBs for the forseeable future.  After some time may consider challenge test with ACE inhibitor and ARB.

## 2019-03-03 ENCOUNTER — APPOINTMENT (OUTPATIENT)
Dept: CARDIOLOGY | Facility: MEDICAL CENTER | Age: 59
DRG: 189 | End: 2019-03-03
Attending: STUDENT IN AN ORGANIZED HEALTH CARE EDUCATION/TRAINING PROGRAM
Payer: MEDICAID

## 2019-03-03 PROBLEM — R05.3 COUGH, PERSISTENT: Status: ACTIVE | Noted: 2019-03-03

## 2019-03-03 LAB
ALBUMIN SERPL BCP-MCNC: 4 G/DL (ref 3.2–4.9)
ALBUMIN/GLOB SERPL: 1.3 G/DL
ALP SERPL-CCNC: 61 U/L (ref 30–99)
ALT SERPL-CCNC: 25 U/L (ref 2–50)
ANION GAP SERPL CALC-SCNC: 12 MMOL/L (ref 0–11.9)
AST SERPL-CCNC: 15 U/L (ref 12–45)
BASOPHILS # BLD AUTO: 0.1 % (ref 0–1.8)
BASOPHILS # BLD: 0.02 K/UL (ref 0–0.12)
BILIRUB SERPL-MCNC: 0.4 MG/DL (ref 0.1–1.5)
BUN SERPL-MCNC: 23 MG/DL (ref 8–22)
C4 SERPL-MCNC: 31 MG/DL (ref 19–52)
C4 SERPL-MCNC: 33 MG/DL (ref 19–52)
CALCIUM SERPL-MCNC: 9.1 MG/DL (ref 8.5–10.5)
CHLORIDE SERPL-SCNC: 104 MMOL/L (ref 96–112)
CO2 SERPL-SCNC: 21 MMOL/L (ref 20–33)
CREAT SERPL-MCNC: 0.9 MG/DL (ref 0.5–1.4)
EOSINOPHIL # BLD AUTO: 0 K/UL (ref 0–0.51)
EOSINOPHIL NFR BLD: 0 % (ref 0–6.9)
ERYTHROCYTE [DISTWIDTH] IN BLOOD BY AUTOMATED COUNT: 43.2 FL (ref 35.9–50)
GLOBULIN SER CALC-MCNC: 3.2 G/DL (ref 1.9–3.5)
GLUCOSE SERPL-MCNC: 241 MG/DL (ref 65–99)
HCT VFR BLD AUTO: 42.8 % (ref 42–52)
HGB BLD-MCNC: 14.7 G/DL (ref 14–18)
IMM GRANULOCYTES # BLD AUTO: 0.12 K/UL (ref 0–0.11)
IMM GRANULOCYTES NFR BLD AUTO: 0.6 % (ref 0–0.9)
LV EJECT FRACT  99904: 80
LV EJECT FRACT MOD 2C 99903: 61.8
LV EJECT FRACT MOD 4C 99902: 57.85
LV EJECT FRACT MOD BP 99901: 53.92
LYMPHOCYTES # BLD AUTO: 1.02 K/UL (ref 1–4.8)
LYMPHOCYTES NFR BLD: 5.4 % (ref 22–41)
MAGNESIUM SERPL-MCNC: 2 MG/DL (ref 1.5–2.5)
MCH RBC QN AUTO: 33 PG (ref 27–33)
MCHC RBC AUTO-ENTMCNC: 34.3 G/DL (ref 33.7–35.3)
MCV RBC AUTO: 96 FL (ref 81.4–97.8)
MONOCYTES # BLD AUTO: 0.49 K/UL (ref 0–0.85)
MONOCYTES NFR BLD AUTO: 2.6 % (ref 0–13.4)
NEUTROPHILS # BLD AUTO: 17.11 K/UL (ref 1.82–7.42)
NEUTROPHILS NFR BLD: 91.3 % (ref 44–72)
NRBC # BLD AUTO: 0 K/UL
NRBC BLD-RTO: 0 /100 WBC
PLATELET # BLD AUTO: 193 K/UL (ref 164–446)
PMV BLD AUTO: 10.8 FL (ref 9–12.9)
POTASSIUM SERPL-SCNC: 3.6 MMOL/L (ref 3.6–5.5)
PROT SERPL-MCNC: 7.2 G/DL (ref 6–8.2)
RBC # BLD AUTO: 4.46 M/UL (ref 4.7–6.1)
SODIUM SERPL-SCNC: 137 MMOL/L (ref 135–145)
WBC # BLD AUTO: 18.8 K/UL (ref 4.8–10.8)

## 2019-03-03 PROCEDURE — 700101 HCHG RX REV CODE 250: Performed by: INTERNAL MEDICINE

## 2019-03-03 PROCEDURE — 85025 COMPLETE CBC W/AUTO DIFF WBC: CPT

## 2019-03-03 PROCEDURE — 93306 TTE W/DOPPLER COMPLETE: CPT

## 2019-03-03 PROCEDURE — 700102 HCHG RX REV CODE 250 W/ 637 OVERRIDE(OP): Performed by: STUDENT IN AN ORGANIZED HEALTH CARE EDUCATION/TRAINING PROGRAM

## 2019-03-03 PROCEDURE — 94640 AIRWAY INHALATION TREATMENT: CPT

## 2019-03-03 PROCEDURE — 700111 HCHG RX REV CODE 636 W/ 250 OVERRIDE (IP): Performed by: STUDENT IN AN ORGANIZED HEALTH CARE EDUCATION/TRAINING PROGRAM

## 2019-03-03 PROCEDURE — 770020 HCHG ROOM/CARE - TELE (206)

## 2019-03-03 PROCEDURE — 700101 HCHG RX REV CODE 250: Performed by: STUDENT IN AN ORGANIZED HEALTH CARE EDUCATION/TRAINING PROGRAM

## 2019-03-03 PROCEDURE — 80053 COMPREHEN METABOLIC PANEL: CPT

## 2019-03-03 PROCEDURE — 83735 ASSAY OF MAGNESIUM: CPT

## 2019-03-03 PROCEDURE — 86160 COMPLEMENT ANTIGEN: CPT | Mod: 91

## 2019-03-03 PROCEDURE — 94760 N-INVAS EAR/PLS OXIMETRY 1: CPT

## 2019-03-03 PROCEDURE — 99223 1ST HOSP IP/OBS HIGH 75: CPT | Mod: GC | Performed by: INTERNAL MEDICINE

## 2019-03-03 PROCEDURE — 36415 COLL VENOUS BLD VENIPUNCTURE: CPT

## 2019-03-03 PROCEDURE — 93306 TTE W/DOPPLER COMPLETE: CPT | Mod: 26 | Performed by: INTERNAL MEDICINE

## 2019-03-03 PROCEDURE — A9270 NON-COVERED ITEM OR SERVICE: HCPCS | Performed by: STUDENT IN AN ORGANIZED HEALTH CARE EDUCATION/TRAINING PROGRAM

## 2019-03-03 RX ORDER — IPRATROPIUM BROMIDE AND ALBUTEROL SULFATE 2.5; .5 MG/3ML; MG/3ML
3 SOLUTION RESPIRATORY (INHALATION) 4 TIMES DAILY
Status: DISCONTINUED | OUTPATIENT
Start: 2019-03-03 | End: 2019-03-04

## 2019-03-03 RX ORDER — OMEPRAZOLE 20 MG/1
20 CAPSULE, DELAYED RELEASE ORAL DAILY
Status: DISCONTINUED | OUTPATIENT
Start: 2019-03-03 | End: 2019-03-04

## 2019-03-03 RX ORDER — BENZONATATE 100 MG/1
100 CAPSULE ORAL 3 TIMES DAILY PRN
Status: DISCONTINUED | OUTPATIENT
Start: 2019-03-03 | End: 2019-03-04 | Stop reason: HOSPADM

## 2019-03-03 RX ORDER — POTASSIUM CHLORIDE 20 MEQ/1
40 TABLET, EXTENDED RELEASE ORAL ONCE
Status: COMPLETED | OUTPATIENT
Start: 2019-03-03 | End: 2019-03-03

## 2019-03-03 RX ADMIN — METHYLPREDNISOLONE SODIUM SUCCINATE 125 MG: 125 INJECTION, POWDER, FOR SOLUTION INTRAMUSCULAR; INTRAVENOUS at 02:04

## 2019-03-03 RX ADMIN — IPRATROPIUM BROMIDE AND ALBUTEROL SULFATE 3 ML: .5; 3 SOLUTION RESPIRATORY (INHALATION) at 02:38

## 2019-03-03 RX ADMIN — METHYLPREDNISOLONE SODIUM SUCCINATE 125 MG: 125 INJECTION, POWDER, FOR SOLUTION INTRAMUSCULAR; INTRAVENOUS at 08:29

## 2019-03-03 RX ADMIN — METHYLPREDNISOLONE SODIUM SUCCINATE 125 MG: 125 INJECTION, POWDER, FOR SOLUTION INTRAMUSCULAR; INTRAVENOUS at 17:03

## 2019-03-03 RX ADMIN — GUAIFENESIN 200 MG: 100 SOLUTION ORAL at 22:52

## 2019-03-03 RX ADMIN — IPRATROPIUM BROMIDE AND ALBUTEROL SULFATE 3 ML: .5; 3 SOLUTION RESPIRATORY (INHALATION) at 20:20

## 2019-03-03 RX ADMIN — IPRATROPIUM BROMIDE AND ALBUTEROL SULFATE 3 ML: .5; 3 SOLUTION RESPIRATORY (INHALATION) at 15:54

## 2019-03-03 RX ADMIN — OMEPRAZOLE 20 MG: 20 CAPSULE, DELAYED RELEASE ORAL at 12:00

## 2019-03-03 RX ADMIN — POTASSIUM CHLORIDE 40 MEQ: 1500 TABLET, EXTENDED RELEASE ORAL at 06:37

## 2019-03-03 RX ADMIN — SENNOSIDES AND DOCUSATE SODIUM 2 TABLET: 8.6; 5 TABLET ORAL at 17:03

## 2019-03-03 RX ADMIN — ENOXAPARIN SODIUM 40 MG: 100 INJECTION SUBCUTANEOUS at 17:03

## 2019-03-03 ASSESSMENT — ENCOUNTER SYMPTOMS
HEMOPTYSIS: 0
VOMITING: 0
NAUSEA: 0
PALPITATIONS: 0
BLURRED VISION: 0
SPUTUM PRODUCTION: 0
DIZZINESS: 0
HEARTBURN: 0
SHORTNESS OF BREATH: 1
HEADACHES: 0
CHILLS: 0
DOUBLE VISION: 0
COUGH: 0
FEVER: 0

## 2019-03-03 NOTE — THERAPY
SPEECH THERAPY CONTACT NOTE: Spoke to RN regarding CSE orders. RN unclear if eval is indicated. RN to clarify orders and page SLP.

## 2019-03-03 NOTE — PROGRESS NOTES
Internal Medicine Interval Note  Note Author: Ilya Victoria D.O.     Name Asad Estevez     1960   Age/Sex 58 y.o. male   MRN [id number]   Code Status FULL     After 5PM or if no immediate response to page, please call for cross-coverage  Attending/Team: Dr. Gonzalez/VERONIKA [place] See Patient List for primary contact information  Call [phone] to page    1st Call - Day Intern (R1):   Dr. Victoria 2nd Call - Day Sr. Resident (R2/R3):   Dr. Pearce       Reason for interval visit  (Principal Problem)   Hypoxia/Shortness of Breath/Cough    Interval Problem Daily Status Update  (24 hours, problem oriented, brief subjective history, new lab/imaging data pertinent to that problem)   The patient is a 58 year old male who presented to the hospital with shortness of breath and throat swelling after taking an ACE inhibitor and ARB. The patient is currently being treated with Steroids, Breathing Treatment, and continuous oxygen.    There were no acute overnight events. The patient reports that he is doing well. Has some shortness of breath, though he is on 4L of oxygen. Feels that there is some fluid building up in the back of his throat.    The patient is scheduled for an echocardiogram today. Will have home oxygen/ambulaion test. Possible discharge if patient's hypoxia improves.    The patient has a STOP-BANG score of 7. Has outpatient sleep study scheduled for 3/4/19.    Review of Systems   Constitutional: Negative for chills and fever.   HENT: Negative for hearing loss and tinnitus.    Eyes: Negative for blurred vision and double vision.   Respiratory: Positive for shortness of breath. Negative for cough, hemoptysis and sputum production.    Cardiovascular: Negative for chest pain and palpitations.   Gastrointestinal: Negative for heartburn, nausea and vomiting.   Genitourinary: Negative for dysuria, frequency and urgency.   Skin: Negative for itching and rash.   Neurological: Negative for dizziness  and headaches.       Disposition/Barriers to discharge:   None    Consultants/Specialty  None  PCP: Damien Perales D.O.      Quality Measures  Quality-Core Measures   Reviewed items::  Labs reviewed, EKG reviewed, Medications reviewed and Radiology images reviewed  De Dios catheter::  No De Dios  DVT prophylaxis - mechanical:  SCDs  Chemical Prophylaxis: Enoxaparin      Physical Exam       Vitals:    03/02/19 2357 03/03/19 0239 03/03/19 0415 03/03/19 0816   BP: 119/67  114/66    Pulse: (!) 104 92  97   Resp: 18 18 18 18   Temp: 36.7 °C (98 °F)  36.7 °C (98.1 °F)    TempSrc: Temporal  Temporal    SpO2: 91%  95% 98%   Weight:       Height:         Body mass index is 38.45 kg/m². Weight: (!) 132.2 kg (291 lb 7.2 oz)  Oxygen Therapy:  Pulse Oximetry: 98 %, O2 (LPM): 4, O2 Delivery: Silicone Nasal Cannula    Physical Exam   Constitutional: He is oriented to person, place, and time and well-developed, well-nourished, and in no distress. No distress.   HENT:   Head: Normocephalic and atraumatic.   Mallampati Score: 4   Eyes: Pupils are equal, round, and reactive to light. EOM are normal. Right eye exhibits no discharge. Left eye exhibits no discharge.   Neck: Neck supple. No JVD present. No tracheal deviation present. No thyromegaly present.   Cardiovascular: Normal rate, regular rhythm, normal heart sounds and intact distal pulses.  Exam reveals no gallop and no friction rub.    No murmur heard.  Pulmonary/Chest: Effort normal and breath sounds normal. No respiratory distress. He has no wheezes. He has no rales.   Abdominal: Soft. Bowel sounds are normal. He exhibits no distension. There is no tenderness.   Musculoskeletal: Normal range of motion. He exhibits no edema, tenderness or deformity.   Neurological: He is alert and oriented to person, place, and time.   Skin: Skin is dry. He is not diaphoretic.   Nursing note and vitals reviewed.        Assessment/Plan     * Angioedema- (present on admission)   Assessment & Plan     Patient presented with throat swelling after taking losartan the night prior to admission.  This is more often seen with lisinopril, though the patient had been taking lisinopril 2 months earlier.  CT neck normal-showed tracheal flattening.   C4 normal, CRP elevated.    Continue to monitor airway.  Angioedema should resolve with cessation of ACE and ARB.  Avoid ACE inhibitors and ARBs in the future.     Acute respiratory failure with hypoxia (HCC)- (present on admission)   Assessment & Plan    The patient remains hypoxic on 4L of oxygen.  STOP-BANG Score: 7, High risk of ALBERTINA.  Underlying ALBERTINA is possibly a component of the patient's hypoxia.    Scheduled for a Transthoracic Echocardiogram today.  Continuing RT Protocol, breathing treatments.  Ambulatory oxygen testing today.  The patient has an outpatient sleep study scheduled for 3/5/19.     Hypertension- (present on admission)   Assessment & Plan    Blood pressure has been stable in the 120s systolic  At home, patient is in the Stage 2 to Stage 3 Range of Hypertension  Continue to monitor while inpatient.  Avoid ACE Inhibitors or ARBs  Follow up outpatient for further chronic blood pressure management.     Cough, persistent- (present on admission)   Assessment & Plan    Likely residual from ACE inhibitor-use.   Also may have a component of GERD.  Avoid ACE inhibitors.  Trial of omeprazole.

## 2019-03-03 NOTE — RESPIRATORY CARE
COPD EDUCATION by COPD CLINICAL EDUCATOR  3/3/2019 at 6:52 AM by Bambi Kumar     Patient reviewed by COPD education team. Patient does not qualify for COPD program.

## 2019-03-03 NOTE — PROGRESS NOTES
Bedside report received from nurse. Assumed care of pt. Pt resting comfortably in bed. A/Ox4, VSS,  All needs met. POC reviewed and white board updated. Tele box on. Call light in reach. Bed locked in lowest position with 2 upper bed rails up.

## 2019-03-03 NOTE — SENIOR ADMIT NOTE
" Senior Admission Note    In summary: Asad Estevez is a 58 y.o. male with HTN of presented to the ER with dyspnea.  Patient reports that he has had a cough for the past 2 months which was initially thought to be due to lisinopril. Patient was recently evaluated by his primary care physician and the lisinopril was changed to losartan.  Yesterday, the patient took losartan and about 30 minutes after he developed increased cough, shortness of breath, and a sensation of his \"throat closing up.\"  At that time, the patient reports that he was not able to lay flat or get any sleep and that he presented to the ER today the symptoms.  Patient denies recent illness, recent travel, sick contacts, new medications other than the losartan, or supplement use.  Patient does not smoke, drink, or use recreational drugs.  Patient denies fever, chills, rigors, weight loss, chest pain, nausea, vomiting, abdominal pain, changes in bowel or bladder habits.  Patient no history of clotting disorders; history of hemochromatosis in his father, but states that he gets regular blood work and has never had any signs of the disease himself.  Of note, patient reports that he did have an episode of low O2 saturation about a month ago at his his primary care doctor's appointment.    In the ER patient is saturating mid 90s on 4 L of O2 via nasal cannula, but desaturates to 85-87 while speaking.  So far workup is unremarkable and the patient's labs are significant for a mild leukocytosis.  CT neck was negative and CT PE showed \"tracheomalacia.\"  This was discussed with ENT and the patient probably does not have tracheomalacia, but rather flattening of the trachea which is common with people who are overweight.  Tracheal flattening does not cause oxygenation issues.    Pertinent physical exam findings:    Heart with regular rate and rhythm.  No murmurs, rubs, or gallops.  Lungs with good bilateral air entry.  Mild crackles at bilateral " bases.  Abdomen soft, nontender, nondistended.  Obese.  No peripheral edema.    Pertinent studies:   As mentioned in the HPI.    Assessment and plan in summary:    Acute respiratory failure with hypoxia  ?  Angioedema (ARBS less likely to cause angioedema than ACEi)  Hypertension  Patient likely has tracheal flattening rather than tracheomalacia, as per ENT. This is a common finding in overweight people and does not cause oxygen issues. WiIl need work up for etiology.  Patient has been treated with steroids, H2 blockers, and given breathing treatments - we will continue these and monitor the patient.  RT/O2 per protocol.   DuoNeb and racemic epinephrine.  Continue O2 via nasal cannula and titrate as appropriate.  Get ECHO.    For full plan, please see Intern note for details   Serena Pearce M.D.

## 2019-03-03 NOTE — ASSESSMENT & PLAN NOTE
Multifactorial etiologies. May be secondary to GERD vs. Residual cough from ACE inhibitor-use vs. Viral upper respiratory infection.  Also may have a component of GERD.  Avoid ACE inhibitors.  Trial of omeprazole BID.  Continue Tessalon Pearls.  Follow up with PCP upon discharge on 3/7/19.

## 2019-03-03 NOTE — CARE PLAN
Problem: Bronchoconstriction:  Goal: Improve in air movement and diminished wheezing  Noted that both R epi and Duoneb are  Per MD. Gave R epi at 1900, HR increased by 30BPM, pt C/O feeling very jittery. Asked if it helped, pt stated not really. Duoneb give next two treatment times and R epi held

## 2019-03-03 NOTE — PROGRESS NOTES
Report received at the bed side. No family at bedside. Pt A/O x4. No complains of pain or SOB. Call light and belongings within reach. Bed in lowest position. Fall precautions in place.

## 2019-03-03 NOTE — CARE PLAN
Problem: Communication  Goal: The ability to communicate needs accurately and effectively will improve  Outcome: PROGRESSING AS EXPECTED    Intervention: [place] patient and significant other/support system to call light to alert staff of needs  Pt oriented to unit. Verbalized understanding of when to ask for help      Problem: Safety  Goal: Will remain free from falls  Outcome: PROGRESSING AS EXPECTED  appropriate fall precautions in place

## 2019-03-04 ENCOUNTER — PATIENT OUTREACH (OUTPATIENT)
Dept: HEALTH INFORMATION MANAGEMENT | Facility: OTHER | Age: 59
End: 2019-03-04

## 2019-03-04 VITALS
OXYGEN SATURATION: 92 % | HEIGHT: 73 IN | HEART RATE: 80 BPM | WEIGHT: 291.45 LBS | BODY MASS INDEX: 38.63 KG/M2 | RESPIRATION RATE: 18 BRPM | TEMPERATURE: 97.3 F | DIASTOLIC BLOOD PRESSURE: 95 MMHG | SYSTOLIC BLOOD PRESSURE: 153 MMHG

## 2019-03-04 LAB
A1AT SERPL-MCNC: 149 MG/DL (ref 90–200)
ALBUMIN SERPL BCP-MCNC: 4.2 G/DL (ref 3.2–4.9)
ALBUMIN/GLOB SERPL: 1.4 G/DL
ALP SERPL-CCNC: 77 U/L (ref 30–99)
ALT SERPL-CCNC: 27 U/L (ref 2–50)
ANION GAP SERPL CALC-SCNC: 14 MMOL/L (ref 0–11.9)
AST SERPL-CCNC: 21 U/L (ref 12–45)
BASOPHILS # BLD AUTO: 0.1 % (ref 0–1.8)
BASOPHILS # BLD: 0.03 K/UL (ref 0–0.12)
BILIRUB SERPL-MCNC: 0.4 MG/DL (ref 0.1–1.5)
BUN SERPL-MCNC: 25 MG/DL (ref 8–22)
C4 SERPL-MCNC: 32 MG/DL (ref 19–52)
CALCIUM SERPL-MCNC: 9.9 MG/DL (ref 8.5–10.5)
CHLORIDE SERPL-SCNC: 104 MMOL/L (ref 96–112)
CO2 SERPL-SCNC: 18 MMOL/L (ref 20–33)
CREAT SERPL-MCNC: 0.91 MG/DL (ref 0.5–1.4)
EOSINOPHIL # BLD AUTO: 0 K/UL (ref 0–0.51)
EOSINOPHIL NFR BLD: 0 % (ref 0–6.9)
ERYTHROCYTE [DISTWIDTH] IN BLOOD BY AUTOMATED COUNT: 46.1 FL (ref 35.9–50)
GLOBULIN SER CALC-MCNC: 2.9 G/DL (ref 1.9–3.5)
GLUCOSE SERPL-MCNC: 373 MG/DL (ref 65–99)
HCT VFR BLD AUTO: 42.8 % (ref 42–52)
HGB BLD-MCNC: 14.2 G/DL (ref 14–18)
IMM GRANULOCYTES # BLD AUTO: 0.19 K/UL (ref 0–0.11)
IMM GRANULOCYTES NFR BLD AUTO: 0.7 % (ref 0–0.9)
LYMPHOCYTES # BLD AUTO: 0.83 K/UL (ref 1–4.8)
LYMPHOCYTES NFR BLD: 3.1 % (ref 22–41)
MAGNESIUM SERPL-MCNC: 2.1 MG/DL (ref 1.5–2.5)
MCH RBC QN AUTO: 32.7 PG (ref 27–33)
MCHC RBC AUTO-ENTMCNC: 33.2 G/DL (ref 33.7–35.3)
MCV RBC AUTO: 98.6 FL (ref 81.4–97.8)
MONOCYTES # BLD AUTO: 0.78 K/UL (ref 0–0.85)
MONOCYTES NFR BLD AUTO: 2.9 % (ref 0–13.4)
NEUTROPHILS # BLD AUTO: 24.84 K/UL (ref 1.82–7.42)
NEUTROPHILS NFR BLD: 93.2 % (ref 44–72)
NRBC # BLD AUTO: 0 K/UL
NRBC BLD-RTO: 0 /100 WBC
PLATELET # BLD AUTO: 218 K/UL (ref 164–446)
PMV BLD AUTO: 10.1 FL (ref 9–12.9)
POTASSIUM SERPL-SCNC: 4.4 MMOL/L (ref 3.6–5.5)
PROT SERPL-MCNC: 7.1 G/DL (ref 6–8.2)
RBC # BLD AUTO: 4.34 M/UL (ref 4.7–6.1)
SODIUM SERPL-SCNC: 136 MMOL/L (ref 135–145)
WBC # BLD AUTO: 26.7 K/UL (ref 4.8–10.8)

## 2019-03-04 PROCEDURE — 86160 COMPLEMENT ANTIGEN: CPT

## 2019-03-04 PROCEDURE — 700111 HCHG RX REV CODE 636 W/ 250 OVERRIDE (IP): Performed by: STUDENT IN AN ORGANIZED HEALTH CARE EDUCATION/TRAINING PROGRAM

## 2019-03-04 PROCEDURE — 700102 HCHG RX REV CODE 250 W/ 637 OVERRIDE(OP): Performed by: STUDENT IN AN ORGANIZED HEALTH CARE EDUCATION/TRAINING PROGRAM

## 2019-03-04 PROCEDURE — A9270 NON-COVERED ITEM OR SERVICE: HCPCS | Performed by: STUDENT IN AN ORGANIZED HEALTH CARE EDUCATION/TRAINING PROGRAM

## 2019-03-04 PROCEDURE — 99239 HOSP IP/OBS DSCHRG MGMT >30: CPT | Mod: GC | Performed by: INTERNAL MEDICINE

## 2019-03-04 PROCEDURE — 85025 COMPLETE CBC W/AUTO DIFF WBC: CPT

## 2019-03-04 PROCEDURE — 83735 ASSAY OF MAGNESIUM: CPT

## 2019-03-04 PROCEDURE — 80053 COMPREHEN METABOLIC PANEL: CPT

## 2019-03-04 PROCEDURE — 36415 COLL VENOUS BLD VENIPUNCTURE: CPT

## 2019-03-04 RX ORDER — PREDNISONE 20 MG/1
20 TABLET ORAL DAILY
Qty: 3 TAB | Refills: 0 | Status: SHIPPED | OUTPATIENT
Start: 2019-03-04 | End: 2019-03-07

## 2019-03-04 RX ORDER — BENZONATATE 100 MG/1
100 CAPSULE ORAL 3 TIMES DAILY PRN
Qty: 30 CAP | Refills: 0 | Status: SHIPPED | OUTPATIENT
Start: 2019-03-04

## 2019-03-04 RX ORDER — BENZONATATE 100 MG/1
100 CAPSULE ORAL 3 TIMES DAILY PRN
Qty: 60 CAP | Refills: 0 | Status: CANCELLED | OUTPATIENT
Start: 2019-03-04

## 2019-03-04 RX ORDER — METHYLPREDNISOLONE SODIUM SUCCINATE 125 MG/2ML
62.5 INJECTION, POWDER, LYOPHILIZED, FOR SOLUTION INTRAMUSCULAR; INTRAVENOUS DAILY
Status: DISCONTINUED | OUTPATIENT
Start: 2019-03-05 | End: 2019-03-04 | Stop reason: HOSPADM

## 2019-03-04 RX ORDER — OMEPRAZOLE 20 MG/1
20 CAPSULE, DELAYED RELEASE ORAL 2 TIMES DAILY
Qty: 30 CAP | Refills: 0 | Status: SHIPPED | OUTPATIENT
Start: 2019-03-04

## 2019-03-04 RX ORDER — PREDNISONE 10 MG/1
10 TABLET ORAL DAILY
Qty: 3 TAB | Refills: 0 | Status: SHIPPED | OUTPATIENT
Start: 2019-03-08 | End: 2019-03-11

## 2019-03-04 RX ORDER — IPRATROPIUM BROMIDE AND ALBUTEROL SULFATE 2.5; .5 MG/3ML; MG/3ML
3 SOLUTION RESPIRATORY (INHALATION)
Status: DISCONTINUED | OUTPATIENT
Start: 2019-03-04 | End: 2019-03-04

## 2019-03-04 RX ORDER — OMEPRAZOLE 20 MG/1
20 CAPSULE, DELAYED RELEASE ORAL 2 TIMES DAILY
Status: DISCONTINUED | OUTPATIENT
Start: 2019-03-04 | End: 2019-03-04 | Stop reason: HOSPADM

## 2019-03-04 RX ADMIN — BENZONATATE 100 MG: 100 CAPSULE ORAL at 00:27

## 2019-03-04 RX ADMIN — OMEPRAZOLE 20 MG: 20 CAPSULE, DELAYED RELEASE ORAL at 06:01

## 2019-03-04 RX ADMIN — METHYLPREDNISOLONE SODIUM SUCCINATE 125 MG: 125 INJECTION, POWDER, FOR SOLUTION INTRAMUSCULAR; INTRAVENOUS at 00:28

## 2019-03-04 RX ADMIN — SENNOSIDES AND DOCUSATE SODIUM 2 TABLET: 8.6; 5 TABLET ORAL at 06:01

## 2019-03-04 ASSESSMENT — ENCOUNTER SYMPTOMS
HEARTBURN: 0
VOMITING: 0
CHILLS: 0
SHORTNESS OF BREATH: 0
PALPITATIONS: 0
DOUBLE VISION: 0
FEVER: 0
DIZZINESS: 0
COUGH: 0
HEMOPTYSIS: 0
HEADACHES: 0
NAUSEA: 0
SPUTUM PRODUCTION: 0
BLURRED VISION: 0

## 2019-03-04 NOTE — PROGRESS NOTES
Discharge instructions given and discussed, signed copy in chart. Pt verbalized understanding and all questions answered. Prescriptions sent to pt pharmacy. Pt discharged home in stable condition on room O2 via walking. Personal belongings w/ patient. IV removed and tolerated well. Tele box removed, monitor room notified.

## 2019-03-04 NOTE — PROGRESS NOTES
Bedside report received from nurse. Assumed care of pt. Pt resting comfortably in bed. A/Ox4,  All needs met. POC reviewed and white board updated. Tele box on. Call light in reach. Bed locked in lowest position with 2 upper bed rails up.

## 2019-03-04 NOTE — DISCHARGE INSTRUCTIONS
Discharge Instructions    Discharged to home by car with relative. Discharged via walking, hospital escort: Yes.  Special equipment needed: Not Applicable    Be sure to schedule a follow-up appointment with your primary care doctor or any specialists as instructed.     Discharge Plan:   Diet Plan: Discussed  Activity Level: Discussed  Confirmed Follow up Appointment: Appointment Scheduled  Confirmed Symptoms Management: Discussed  Medication Reconciliation Updated: Yes  Influenza Vaccine Indication: Not indicated: Previously immunized this influenza season and > 8 years of age    I understand that a diet low in cholesterol, fat, and sodium is recommended for good health. Unless I have been given specific instructions below for another diet, I accept this instruction as my diet prescription.   Other diet: regular      Special Instructions: None    · Is patient discharged on Warfarin / Coumadin?   No     Depression / Suicide Risk    As you are discharged from this [facility] facility, it is important to learn how to keep safe from harming yourself.    Recognize the warning signs:  · Abrupt changes in personality, positive or negative- including increase in energy   · Giving away possessions  · Change in eating patterns- significant weight changes-  positive or negative  · Change in sleeping patterns- unable to sleep or sleeping all the time   · Unwillingness or inability to communicate  · Depression  · Unusual sadness, discouragement and loneliness  · Talk of wanting to die  · Neglect of personal appearance   · Rebelliousness- reckless behavior  · Withdrawal from people/activities they love  · Confusion- inability to concentrate     If you or a loved one observes any of these behaviors or has concerns about self-harm, here's what you can do:  · Talk about it- your feelings and reasons for harming yourself  · Remove any means that you might use to hurt yourself (examples: pills, rope, extension cords,  firearm)  · Get professional help from the community (Mental Health, Substance Abuse, psychological counseling)  · Do not be alone:Call your Safe Contact- someone whom you trust who will be there for you.  · Call your local CRISIS HOTLINE 668-5118 or 772-902-8168  · Call your local Children's Mobile Crisis Response Team [place] (155) 001-3287 or www.Searchles  · Call the toll free National Suicide Prevention Hotlines   · National Suicide Prevention Lifeline 330-983-TAEJ (3927)  · [employer] Line Network 800-SUICIDE (789-5162)        Acute Respiratory Failure, Adult  Acute respiratory failure occurs when there is not enough oxygen passing from your lungs to your body. When this happens, your lungs have trouble removing carbon dioxide from the blood. This causes your blood oxygen level to drop too low as carbon dioxide builds up.  Acute respiratory failure is a medical emergency. It can develop quickly, but it is temporary if treated promptly. Your lung capacity, or how much air your lungs can hold, may improve with time, exercise, and treatment.  What are the causes?  There are many possible causes of acute respiratory failure, including:  · Lung injury.  · Chest injury or damage to the ribs or tissues near the lungs.  · Lung conditions that affect the flow of air and blood into and out of the lungs, such as pneumonia, acute respiratory distress syndrome, and cystic fibrosis.  · Medical conditions, such as strokes or spinal cord injuries, that affect the muscles and nerves that control breathing.  · Blood infection (sepsis).  · Inflammation of the pancreas (pancreatitis).  · A blood clot in the lungs (pulmonary embolism).  · A large-volume blood transfusion.  · Burns.  · Near-drowning.  · Seizure.  · Smoke inhalation.  · Reaction to medicines.  · Alcohol or drug overdose.  What increases the risk?  This condition is more likely to develop in people who have:  · A blocked airway.  · Asthma.  · A  condition or disease that damages or weakens the muscles, nerves, bones, or tissues that are involved in breathing.  · A serious infection.  · A health problem that blocks the unconscious reflex that is involved in breathing, such as hypothyroidism or sleep apnea.  · A lung injury or trauma.  What are the signs or symptoms?  Trouble breathing is the main symptom of acute respiratory failure. Symptoms may also include:  · Rapid breathing.  · Restlessness or anxiety.  · Skin, lips, or fingernails that appear blue (cyanosis).  · Rapid heart rate.  · Abnormal heart rhythms (arrhythmias).  · Confusion or changes in behavior.  · Tiredness or loss of energy.  · Feeling sleepy or having a loss of consciousness.  How is this diagnosed?  Your health care provider can diagnose acute respiratory failure with a medical history and physical exam. During the exam, your health care provider will listen to your heart and check for crackling or wheezing sounds in your lungs. Your may also have tests to confirm the diagnosis and determine what is causing respiratory failure. These tests may include:  · Measuring the amount of oxygen in your blood (pulse oximetry). The measurement comes from a small device that is placed on your finger, earlobe, or toe.  · Other blood tests to measure blood gases and to look for signs of infection.  · Sampling your cerebral spinal fluid or tracheal fluid to check for infections.  · Chest X-ray to look for fluid in spaces that should be filled with air.  · Electrocardiogram (ECG) to look at the heart's electrical activity.  How is this treated?  Treatment for this condition usually takes places in a hospital intensive care unit (ICU). Treatment depends on what is causing the condition. It may include one or more treatments until your symptoms improve. Treatment may include:  · Supplemental oxygen. Extra oxygen is given through a tube in the nose, a face mask, or a henry.  · A device such as a continuous  positive airway pressure (CPAP) or bi-level positive airway pressure (BiPAP or BPAP) machine. This treatment uses mild air pressure to keep the airways open. A mask or other device will be placed over your nose or mouth. A tube that is connected to a motor will deliver oxygen through the mask.  · Ventilator. This treatment helps move air into and out of the lungs. This may be done with a bag and mask or a machine. For this treatment, a tube is placed in your windpipe (trachea) so air and oxygen can flow to the lungs.  · Extracorporeal membrane oxygenation (ECMO). This treatment temporarily takes over the function of the heart and lungs, supplying oxygen and removing carbon dioxide. ECMO gives the lungs a chance to recover. It may be used if a ventilator is not effective.  · Tracheostomy. This is a procedure that creates a hole in the neck to insert a breathing tube.  · Receiving fluids and medicines.  · Rocking the bed to help breathing.  Follow these instructions at home:  · Take over-the-counter and prescription medicines only as told by your health care provider.  · Return to normal activities as told by your health care provider. Ask your health care provider what activities are safe for you.  · Keep all follow-up visits as told by your health care provider. This is important.  How is this prevented?  Treating infections and medical conditions that may lead to acute respiratory failure can help prevent the condition from developing.  Contact a health care provider if:  · You have a fever.  · Your symptoms do not improve or they get worse.  Get help right away if:  · You are having trouble breathing.  · You lose consciousness.  · Your have cyanosis or turn blue.  · You develop a rapid heart rate.  · You are confused.  These symptoms may represent a serious problem that is an emergency. Do not wait to see if the symptoms will go away. Get medical help right away. Call your local emergency services (911 in the  [place] Do not drive yourself to the hospital.   This information is not intended to replace advice given to you by your health care provider. Make sure you discuss any questions you have with your health care provider.  Document Released: 12/23/2014 Document Revised: 07/15/2017 Document Reviewed: 07/05/2017  [employer] Interactive Patient Education © 2017 [employer]        Angioedema  Angioedema is the sudden swelling of tissue in the body. Angioedema can affect any part of the body, but it most often affects the deeper parts of the skin, causing red, itchy patches (hives) to appear over the affected area. It often begins during the night and is found in the morning.  Depending on the cause, angioedema may happen:  · Only once.  · Several times. It may come back in unpredictable patterns.  · Repeatedly for several years. Over time, it may gradually stop coming back.  Angioedema can be life-threatening if it affects the air passages that you breathe through.  What are the causes?  This condition may be caused by:  · Foods, such as milk, eggs, shellfish, wheat, or nuts.  · Certain medicines, such as ACE inhibitors, antibiotics, nonsteroidal anti-inflammatory drugs, birth control pills, or dyes used in X-rays.  · Insect stings.  · Infections.  Angioedema can be inherited, and episodes can be triggered by:  · Mild injury.  · Dental work.  · Surgery.  · Stress.  · Sudden changes in temperature.  · Exercise.  In some cases, the cause of this condition is not known.  What are the signs or symptoms?  Symptoms of this condition depend on where the swelling happens. Symptoms may include:  · Swollen skin.  · Red, itchy patches of skin (hives).  · Redness in the affected area.  · Pain in the affected area.  · Swollen lips or tongue.  · Wheezing.  · Breathing problems.  If your internal organs are involved, symptoms may also include:  · Nausea.  · Abdominal pain.  · Vomiting.  · Difficulty swallowing.  · Difficulty passing  urine.  How is this diagnosed?  This condition may be diagnosed based on:  · An exam of the affected area.  · Your medical history.  · Whether anyone in your family has had this condition before.  · A review of any medicines you have been taking.  · Tests, including:  ¨ Allergy skin tests to see if the condition was caused by an allergic reaction.  ¨ Blood tests to see if the condition was caused by a gene.  ¨ Tests to check for underlying diseases that could cause the condition.  How is this treated?  Treatment for this condition depends on the cause. It may involve any of the following:  · If something triggered the condition, making changes to keep it from triggering the condition again.  · If the condition affects your breathing, having tubes placed in your airway to keep it open.  · Taking medicines to treat symptoms or prevent future episodes. These may include:  ¨ Antihistamines.  ¨ Epinephrine injections.  ¨ Steroids.  If your condition is severe, you may need to be treated at the hospital. Angioedema usually gets better in 24-48 hours.  Follow these instructions at home:  · Take over-the-counter and prescription medicines only as told by your health care provider.  · If you were given medicines for emergency allergy treatment, always carry them with you.  · Wear a medical bracelet as told by your health care provider.  · If something triggers your condition, avoid the trigger, if possible.  · If your condition is inherited and you are thinking about having children, talk to your health care provider. It is important to discuss the risks of passing on the condition to your children.  Contact a health care provider if:  · You have repeated episodes of angioedema.  · Episodes of angioedema start to happen more often than they used to, even after you take steps to prevent them.  · You have episodes of angioedema that are more severe than they have been before, even after you take steps to prevent them.  · You  are thinking about having children.  Get help right away if:  · You have severe swelling of your mouth, tongue, or lips.  · You have trouble breathing.  · You have trouble swallowing.  · You faint.  This information is not intended to replace advice given to you by your health care provider. Make sure you discuss any questions you have with your health care provider.  Document Released: 02/26/2003 Document Revised: 07/15/2017 Document Reviewed: 06/27/2017  [employer] Interactive Patient Education © 2017 [employer]

## 2019-03-04 NOTE — DISCHARGE SUMMARY
Internal Medicine Discharge Summary  Note Author: Ilya Victoria D.O.       Name Asad Estevez     1960   Age/Sex 58 y.o. male   MRN [id number]         Admit Date:  3/2/2019       Discharge Date:   3/4/19    Service:   [facility] Internal Medicine Blue Team  Attending Physician(s):   Dr. Gonzalez   Senior Resident(s):   Dr. Pearce  Moe Resident(s):   Dr. Victoria  PCP: Damien Perales D.O.      Primary Diagnosis:   Acute Respiratory Failure with Hypoxia    Secondary Diagnoses:                Principal Problem:    Angioedema POA: Yes  Active Problems:    Acute respiratory failure with hypoxia (HCC) POA: Yes    Hypertension POA: Yes    Cough, persistent POA: Yes  Resolved Problems:    * No resolved hospital problems. *      Hospital Summary (Brief Narrative):              The patient is a 57 y/o male with a PMH of hypertension who presented to the hospital with hypoxia and worsening shortness of breath. Two months prior the patient had been started on lisinopril and states that he had developed a dry, nagging cough. He went to his physician's office and was found to be hypoxic on room air and was switched to losartan from lisinopril. States that he experienced throat tightening and shortness of breath that night which prompted the patient to come to the hospital. In the emergency room, the patient ws requiring 4 liters of oxygen to reach on O2 saturation in the mid-90s. The patient was administered IV Methylprednisolone and Racemic Epinephrine and admitted to the hospital for acute respiratory failure with hypoxia.         The patient's hospital course was uncomplicated. CT PE scan revealed no pulmonary embolus. CT scan of the neck revealed only tracheal flattening. Echocardiogram revealed no valvular damage and a hyperdynamic EF of 85%. The patient improved symptomatically. His oxygen requirements reduced to the 90's on room air. The patient was started on a trial of omeprazole for GERD. The  patient was discharged on 3/4/19 in good medical condition with a short course of oral prednisone, tessalon pearls, and instructions to follow up with his PCP for further management of his hypertension.          Patient /Hospital Summary (Details -- Problem Oriented) :          Acute respiratory failure with hypoxia (HCC)   Assessment & Plan    The patient remains hypoxic on 4L of oxygen.  STOP-BANG Score: 7, High risk of ALBERTINA.  The patient is saturating in the mid 90's on room air.  Passed ambulatory oxygen evaluation yesterday.  Underlying ALBERTINA is possibly a component of the patient's hypoxia.  The patient was instructed to follow up with his PCP upon discharge on 3/7/19.  The patient has an outpatient sleep study appointment scheduled for 3/5/19.     * Angioedema   Assessment & Plan    Resolved.  Patient presented with throat swelling after taking losartan the night prior to admission.  This is more often seen with lisinopril, though the patient had been taking lisinopril 2 months earlier.  CT neck normal-showed tracheal flattening.   C4 normal, CRP elevated.    Continue to monitor airway.  Angioedema should resolve with cessation of ACE inhibitor and ARB.  Avoid ACE inhibitors and ARBs for the forseeable future.  After some time may consider challenge test with ACE inhibitor and ARB.     Hypertension   Assessment & Plan    Blood pressure has been stable in the 120s systolic  At home, patient is in the Stage 2 to Stage 3 Range of Hypertension  Continue to monitor while inpatient.  Avoid ACE Inhibitors or ARBs for now.   Follow up outpatient with PCP for further chronic blood pressure management. May consider calcium channel blockers or thiazide diuretics.     Cough, persistent   Assessment & Plan    Multifactorial etiologies. May be secondary to GERD vs. Residual cough from ACE inhibitor-use vs. Viral upper respiratory infection.  Also may have a component of GERD.  Avoid ACE inhibitors.  Trial of omeprazole  BID.  Continue Tessalon Pearls.  Follow up with PCP upon discharge on 3/7/19.         Consultants:     None    Procedures:        Echocardiogram 3/3/19    Imaging/ Testing:      EC-ECHOCARDIOGRAM COMPLETE W/O CONT   Final Result      CT-SOFT TISSUE NECK W/O   Final Result         Normal epiglottis. The nasopharynx, oropharynx, and hypopharynx show normal airways and no abnormal soft tissue swelling.      CT-CTA CHEST PULMONARY ARTERY W/ RECONS   Final Result      No evidence of pulmonary embolus.      Collapsed the trachea suggesting tracheomalacia.      Peripheral interstitial prominence may indicate pulmonary edema.            DX-CHEST-PORTABLE (1 VIEW)   Final Result         1. No acute cardiopulmonary abnormalities are identified.          Echocardiogram 3/3/19  Technically difficult study - adequate information is obtained.   Hyperdynamic left ventricular systolic function.  Left ventricular ejection fraction is visually estimated to be greater   than 75%.  Mild concentric left ventricular hypertrophy.  Right ventricle not well visualized but appeared to have grossly normal   size and systolic function.  No evidence of valvular abnormality based on blind Doppler evaluation.   Borderline enlarged ascending aorta at 3.7cm.  Unable to estimate right sided intracardiac pressures.       Discharge Medications:         Medication Reconciliation: Completed       Medication List      START taking these medications      Instructions   benzonatate 100 MG Caps  Commonly known as:  TESSALON   Take 1 Cap by mouth 3 times a day as needed for Cough.  Dose:  100 mg     omeprazole 20 MG delayed-release capsule  Commonly known as:  PRILOSEC   Take 1 Cap by mouth 2 Times a Day.  Dose:  20 mg     * predniSONE 20 MG Tabs  Commonly known as:  DELTASONE   Take 1 Tab by mouth every day for 3 days.  Dose:  20 mg     * predniSONE 10 MG Tabs  Start taking on:  3/8/2019  Commonly known as:  DELTASONE   Take 1 Tab by mouth every day for 3  days.  Dose:  10 mg        * This list has 2 medication(s) that are the same as other medications prescribed for you. Read the directions carefully, and ask your doctor or other care provider to review them with you.            STOP taking these medications    losartan 25 MG Tabs  Commonly known as:  COZAAR            Disposition:   As tolerated    Diet:  Regular    Activity:   As tolerated    Instructions:        The patient was instructed to return to the ER in the event of worsening symptoms. I have counseled the patient on the importance of compliance and the patient has agreed to proceed with all medical recommendations and follow up plan indicated above.   The patient understands that all medications come with benefits and risks. Risks may include permanent injury or death and these risks can be minimized with close reassessment and monitoring.        Primary Care Provider:    Damien Perales, DO  Discharge summary faxed to primary care provider:  Deferred  Copy of discharge summary given to the patient: Deferred          Follow up appointment details :       1. Benedicto Arroyo N.P.. Go on 3/7/2019.      Specialty:  Nurse Practitioner   Why:  Please arrive at 10:05 am for a 10:20 am appointment with your primary care provider.   Contact information   [id number]   [place] [phone]       2. Attend established appointment with Pulmonology for outpatient sleep study on 3/5/19.    Pending Studies:        None    Time spent on discharge day patient visit, preparing discharge paperwork and arranging for patient follow up.    Summary of follow up issues:   1. Due to this episode of angioedema secondary to ACE inhibitors vs. ARBs, the patient should avoid ACE inhibitors and ARBs for the time being for management of blood pressure. Recommend calcium channel blocker or thiazide. At later date may consider a challenge test of ACE inhibitor or ARB, but not recommended at this time.  2. We recommend that  "the patient attend his outpatient sleep-study appointment on 3/5/19 as the patient has high risk for obstructive sleep apnea.  3. The patient will need an alternative medication for management of his blood pressure which has been well controlled in the hospital.    Discharge Time (Minutes) :    90  Hospital Course Type:  Inpatient Stay >2 midnights      Condition on Discharge    Fair  ______________________________________________________________________    Interval history/exam for day of discharge:    The patient reports that his shortness of breath has improved significantly. He has been saturating well in the 90's on room air. Overnight, the patient did have a residual nagging cough that improved with tessalon pearls. The patient states that he is feeling better and would like to go home.    The patient will be discharged home with instructions to follow up with his PCP and with pulmonology for a sleep study on 3/5/19.    Review of Systems   Constitutional: Negative for chills and fever.   HENT: Negative for hearing loss and tinnitus.    Eyes: Negative for blurred vision and double vision.   Respiratory: Positive for shortness of breath. Negative for cough, hemoptysis and sputum production.    Cardiovascular: Negative for chest pain and palpitations.   Gastrointestinal: Negative for heartburn, nausea and vomiting.   Genitourinary: Negative for dysuria, frequency and urgency.   Skin: Negative for itching and rash.   Neurological: Negative for dizziness and headaches.     Physical Exam   /91   Pulse 90   Temp 36.4 °C (97.5 °F) (Temporal)   Resp 18   Ht 1.854 m (6' 1\")   Wt (!) 132.2 kg (291 lb 7.2 oz)   SpO2 91%   BMI 38.45 kg/m²   Constitutional: He is oriented to person, place, and time and well-developed, well-nourished, and in no distress. No distress.   HENT:   Head: Normocephalic and atraumatic.   Mallampati Score: 4   Eyes: Pupils are equal, round, and reactive to light. EOM are normal. Right " eye exhibits no discharge. Left eye exhibits no discharge.   Neck: Neck supple. No JVD present. No tracheal deviation present. No thyromegaly present.   Cardiovascular: Normal rate, regular rhythm, normal heart sounds and intact distal pulses.  Exam reveals no gallop and no friction rub.    No murmur heard.  Pulmonary/Chest: Effort normal and breath sounds normal. No respiratory distress. He has no wheezes. He has no rales.   Abdominal: Soft. Bowel sounds are normal. He exhibits no distension. There is no tenderness.   Musculoskeletal: Normal range of motion. He exhibits no edema, tenderness or deformity.   Neurological: He is alert and oriented to person, place, and time.   Skin: Skin is dry. He is not diaphoretic.   Nursing note and vitals reviewed.    Most Recent Labs:    Lab Results   Component Value Date/Time    WBC 26.7 (H) 03/04/2019 12:44 AM    RBC 4.34 (L) 03/04/2019 12:44 AM    HEMOGLOBIN 14.2 03/04/2019 12:44 AM    HEMATOCRIT 42.8 03/04/2019 12:44 AM    MCV 98.6 (H) 03/04/2019 12:44 AM    MCH 32.7 03/04/2019 12:44 AM    MCHC 33.2 (L) 03/04/2019 12:44 AM    MPV 10.1 03/04/2019 12:44 AM    NEUTSPOLYS 93.20 (H) 03/04/2019 12:44 AM    LYMPHOCYTES 3.10 (L) 03/04/2019 12:44 AM    MONOCYTES 2.90 03/04/2019 12:44 AM    EOSINOPHILS 0.00 03/04/2019 12:44 AM    BASOPHILS 0.10 03/04/2019 12:44 AM      Lab Results   Component Value Date/Time    SODIUM 136 03/04/2019 12:44 AM    POTASSIUM 4.4 03/04/2019 12:44 AM    CHLORIDE 104 03/04/2019 12:44 AM    CO2 18 (L) 03/04/2019 12:44 AM    GLUCOSE 373 (H) 03/04/2019 12:44 AM    BUN 25 (H) 03/04/2019 12:44 AM    CREATININE 0.91 03/04/2019 12:44 AM    CREATININE 1.2 05/11/2008 11:45 AM      Lab Results   Component Value Date/Time    ALTSGPT 27 03/04/2019 12:44 AM    ASTSGOT 21 03/04/2019 12:44 AM    ALKPHOSPHAT 77 03/04/2019 12:44 AM    TBILIRUBIN 0.4 03/04/2019 12:44 AM    LIPASE 18 09/21/2009 02:28 PM    ALBUMIN 4.2 03/04/2019 12:44 AM    GLOBULIN 2.9 03/04/2019 12:44 AM     INR 1.28 (H) 11/12/2013 10:50 AM     Lab Results   Component Value Date/Time    PROTHROMBTM 14.3 (H) 11/12/2013 10:50 AM    INR 1.28 (H) 11/12/2013 10:50 AM

## 2019-03-04 NOTE — PROGRESS NOTES
Internal Medicine Interval Note  Note Author: Ilya Victoria D.O.     Name Asad Estevez     1960   Age/Sex 58 y.o. male   MRN [id number]   Code Status FULL     After 5PM or if no immediate response to page, please call for cross-coverage  Attending/Team: Dr. Gonzalez/VERONIKA [place]en[place] See Patient List for primary contact information  Call [phone] to page    1st Call - Day Intern (R1):   Dr. Victoria 2nd Call - Day Sr. Resident (R2/R3):   Dr. Pearce       Reason for interval visit  (Principal Problem)   Hypoxia/Shortness of Breath/Cough    Interval Problem Daily Status Update  (24 hours, problem oriented, brief subjective history, new lab/imaging data pertinent to that problem)   The patient is a 58 year old male who presented to the hospital with shortness of breath and throat swelling after taking an ACE inhibitor and ARB. The patient is currently being treated with Steroids, Breathing Treatment, and continuous oxygen.    The patient reports that his shortness of breath has improved significantly. He has been saturating well in the 90's on room air. Overnight, the patient did have a residual nagging cough that improved with tessalon pearls. The patient states that he is feeling better and would like to go home.     The patient will be discharged home with instructions to follow up with his PCP on 3/7/19 and with pulmonology for a sleep study on 3/5/19.     Review of Systems   Constitutional: Negative for chills and fever.   HENT: Negative for hearing loss and tinnitus.    Eyes: Negative for blurred vision and double vision.   Respiratory: Negative for cough, hemoptysis, sputum production and shortness of breath.    Cardiovascular: Negative for chest pain and palpitations.   Gastrointestinal: Negative for heartburn, nausea and vomiting.   Genitourinary: Negative for dysuria and urgency.   Skin: Negative for itching and rash.   Neurological: Negative for dizziness and headaches.        Disposition/Barriers to discharge:   None    Consultants/Specialty  None  PCP: Damien Perales D.O.      Quality Measures  Quality-Core Measures   Reviewed items::  Labs reviewed, EKG reviewed, Medications reviewed and Radiology images reviewed  De Dios catheter::  No De Dios  DVT prophylaxis - mechanical:  SCDs  Chemical Prophylaxis: Enoxaparin      Physical Exam       Vitals:    03/03/19 2020 03/04/19 0023 03/04/19 0504 03/04/19 0755   BP:  119/73 108/58 151/91   Pulse: 88 61 84 90   Resp: 17 18 20 18   Temp:  36.2 °C (97.2 °F) 36.5 °C (97.7 °F) 36.4 °C (97.5 °F)   TempSrc:  Temporal Temporal Temporal   SpO2: 94% 91% 98% 91%   Weight:       Height:         Body mass index is 38.45 kg/m².   Oxygen Therapy:  Pulse Oximetry: 91 %, O2 (LPM): 0, FiO2%: 21 %, O2 Delivery: None (Room Air)    Physical Exam   Constitutional: He is oriented to person, place, and time and well-developed, well-nourished, and in no distress. No distress.   HENT:   Head: Normocephalic and atraumatic.   Mallampati Score: 4   Eyes: Pupils are equal, round, and reactive to light. EOM are normal. Right eye exhibits no discharge. Left eye exhibits no discharge.   Neck: Neck supple. No JVD present. No tracheal deviation present. No thyromegaly present.   Cardiovascular: Normal rate, regular rhythm, normal heart sounds and intact distal pulses.  Exam reveals no gallop and no friction rub.    No murmur heard.  Pulmonary/Chest: Effort normal and breath sounds normal. No respiratory distress. He has no wheezes. He has no rales.   Abdominal: Soft. Bowel sounds are normal. He exhibits no distension. There is no tenderness.   Musculoskeletal: Normal range of motion. He exhibits no edema, tenderness or deformity.   Neurological: He is alert and oriented to person, place, and time.   Skin: Skin is dry. He is not diaphoretic.   Nursing note and vitals reviewed.        Assessment/Plan     * Angioedema- (present on admission)   Assessment & Plan     Resolved.  Patient presented with throat swelling after taking losartan the night prior to admission.  This is more often seen with lisinopril, though the patient had been taking lisinopril 2 months earlier.  CT neck normal-showed tracheal flattening.   C4 normal, CRP elevated.    Continue to monitor airway.  Angioedema should resolve with cessation of ACE inhibitor and ARB.  Avoid ACE inhibitors and ARBs for the forseeable future.  After some time may consider challenge test with ACE inhibitor and ARB.     Acute respiratory failure with hypoxia (HCC)- (present on admission)   Assessment & Plan    The patient remains hypoxic on 4L of oxygen.  STOP-BANG Score: 7, High risk of ALBERTINA.  The patient is saturating in the mid 90's on room air.  Passed ambulatory oxygen evaluation yesterday.  Underlying ALBERTINA is possibly a component of the patient's hypoxia.  The patient was instructed to follow up with his PCP upon discharge on 3/7/19.  The patient has an outpatient sleep study appointment scheduled for 3/5/19.     Hypertension- (present on admission)   Assessment & Plan    Blood pressure has been stable in the 120s systolic  At home, patient is in the Stage 2 to Stage 3 Range of Hypertension  Continue to monitor while inpatient.  Avoid ACE Inhibitors or ARBs for now.   Follow up outpatient with PCP for further chronic blood pressure management. May consider calcium channel blockers or thiazide diuretics.     Cough, persistent- (present on admission)   Assessment & Plan    Multifactorial etiologies. May be secondary to GERD vs. Residual cough from ACE inhibitor-use vs. Viral upper respiratory infection.  Also may have a component of GERD.  Avoid ACE inhibitors.  Trial of omeprazole BID.  Continue Tessalon Pearls.  Follow up with PCP upon discharge on 3/7/19.

## 2019-03-04 NOTE — PROGRESS NOTES
Report received at the bed side. No family at bedside. No complains of pain or SOB. Call light and belongings within reach. Fall precautions in place. Bed in lowest position.

## 2019-03-04 NOTE — CARE PLAN
Problem: Knowledge Deficit  Goal: Knowledge of disease process/condition, treatment plan, diagnostic tests, and medications will improve  Outcome: PROGRESSING AS EXPECTED  Educated pt on POC with nice discusion

## 2019-07-18 ENCOUNTER — TELEPHONE (OUTPATIENT)
Dept: SCHEDULING | Facility: IMAGING CENTER | Age: 59
End: 2019-07-18

## 2019-08-02 NOTE — NON-PROVIDER
"Reason for interval visit (Principal Problem)    Shortness of breath, Hypoxia    Interval Problem Daily Status Update (24 hours, problem oriented, brief subjective history, new lab/imaging data pertinent to that problem)    No acute events overnight. He continues to use O2 at 4L nasal canula. He reports feeling better with the O2 and has noticed an improvement in his throat tightness and cough. He states he still has some cough and \"scratchy\" throat, but he states it is better. CT suggested tracheomalacia, but discussion with on call ENT suggested this may be more related to body composition and the angioedema.     Review of Systems    Constitutional: Negative for fever, chills, weight changes  HENT: Negative for sinus complaints, congestion, hearing loss, or tinnitus  Eyes: Negative for blurred vision and double vision.    Respiratory: Negative for shortness of breath now that he has been on O2. Complains of dry, intermittent cough.  Cardiovascular: Negative for chest pain and palpitations.    Gastrointestinal: Negative for pain or bowel changes   Musculoskeletal: Negative for weakness, pain, or joint swelling   Neurological:  No headache, numbness or tingling  Psychiatric/Behavioral: Negative for depression and suicidal ideas.    All other systems negative.       Disposition/Barriers to discharge:    None.      Consultants/Specialty    None      Quality Measures  Quality-Core Measures   Reviewed items::  EKG reviewed, Labs reviewed, Medications reviewed and Radiology images reviewed  De Dios catheter::  No De Dios  DVT prophylaxis pharmacological::  Enoxaparin (Lovenox)  DVT prophylaxis - mechanical:  SCDs      Medications   Current Facility-Administered Medications   Medication Dose Frequency Provider Last Rate Last Dose   • ipratropium-albuterol (DUONEB) nebulizer solution  3 mL 4XDAY Robert Gonzalez M.D.       • senna-docusate (PERICOLACE or SENOKOT S) 8.6-50 MG per tablet 2 Tab  2 Tab BID Ilya Victoria D.O.  "       And   • polyethylene glycol/lytes (MIRALAX) PACKET 1 Packet  1 Packet QDAY PRN Ilya Victoria D.O.        And   • magnesium hydroxide (MILK OF MAGNESIA) suspension 30 mL  30 mL QDAY PRN Ilya Victoria D.O.        And   • bisacodyl (DULCOLAX) suppository 10 mg  10 mg QDAY PRN Ilya Victoria D.O.       • NS infusion   Continuous Ilya Victoria D.O. 75 mL/hr at 03/02/19 1734 1,000 mL at 03/02/19 1734   • enoxaparin (LOVENOX) inj 40 mg  40 mg DAILY MUNIR LarsonODerek   40 mg at 03/02/19 1730   • acetaminophen (TYLENOL) tablet 650 mg  650 mg Q6HRS PRN Ilya Victoria D.O.       • Respiratory Care per Protocol   Continuous RT Ilya Victoria D.O.       • hydrALAZINE (APRESOLINE) injection 10 mg  10 mg Q4HRS PRN Serena Pearce M.D.       • racepinephrine (MICRONEFRIN) 2.25 % nebulizer solution 0.5 mL  0.5 mL Q4HRS (RT) Serena Pearce M.D.   Stopped at 03/02/19 2300   • methylPREDNISolone sod succ (SOLU-MEDROL) 125 MG injection 125 mg  125 mg Q8HRS Serena Pearce M.D.   125 mg at 03/03/19 0829     Last reviewed on 3/2/2019 11:06 AM by Lucia Quiroz, Pharmacy Intern    Labs/Radiology  Recent Labs      03/02/19   0850  03/03/19   0257   SODIUM 101  136  137   POTASSIUM 102  4.0  3.6     19  23*   CREATININE 109  0.93  0.90   GLUCOSE 112  114*  241*   CALCIUM 105  10.2  9.1     16  15     29  25   ALBUMIN 111  4.7  4.0   TOTAL BILIRUBIN 113  0.7  0.4   MAGNESIUM 561   --   2.0   WBC 1501  14.6*  18.8*   HGB 1503  15.7  14.7   HCT 1504  44.5  42.8   RBC 1502  4.73  4.46*     EKG: 3/2/2019 Interpretive Statements   SINUS RHYTHM   BORDERLINE LOW VOLTAGE IN FRONTAL LEADS   Compared to ECG 11/12/2013 09:47:29   No significant changes     Measurements   Intervals                                Axis   Rate:       86                          P:          60   CO:         168                        QRS:        43   QRSD:       88                      T:          " 60   QT:         380   QTc:        455     CTA: 3/2/2019   Impression     No evidence of pulmonary embolus.    Collapsed the trachea suggesting tracheomalacia.    Peripheral interstitial prominence may indicate pulmonary edema.     CT neck soft tissue: 3/2/2019   Impression       Normal epiglottis. The nasopharynx, oropharynx, and hypopharynx show normal airways and no abnormal soft tissue swelling.     CXR portable: 3/2/2019  Impression       1. No acute cardiopulmonary abnormalities are identified.         Physical Exam    Constitutional: Alert, cooperative, responds approprietly. No signs of acute distress.   Eyes: PERRLA. Conjunctiva normal with no obvious discharge bilaterally   Neck: No swelling, tenderness, supple, no JVD present, no tracheal deviation or thyroidmegaly.  Cardiovascular: Normal rate, regular rhythm, and normal heart sounds. No gallop, rubs, or murmurs.   Pulmonary/Chest: Lungs clear to auscultation in all fields. Symmetrical, non-labored respirations. No signs of respiratory distress   Abdominal: Soft, non-tender. Bowel sounds normal and present in all quadrants. No tenderness on palpation   Musculoskeletal: Normal range of motion. No tenderness or edema.   Skin: Intact, warm, pink, and dry. No rashes.   Neurological: A&Ox4. No agitation or disorientation. No cranial nerve deficit.    Psychiatric: Mood, affect and judgement normal  Nursing note and vitals reviewed.        Vital Signs  /83   Pulse 96   Temp 36.2 °C (97.1 °F) (Temporal)   Resp 18   Ht 1.854 m (6' 1\")   Wt (!) 132.2 kg (291 lb 7.2 oz)   SpO2 97%   BMI 38.45 kg/m²       Assessment/Plan   * Angioedema- (present on admission)   Assessment & Plan     Patient presented with throat swelling after taking losartan last night.  Had difficulty breathing. This has improved since being on O2.  Also had eaten shrimp prior to having this reaction.  No known history of seafood allergies.  This is likely related to an adverse " reaction to his medication. DDx includes: ALBERTINA, silent GERD, med reaction     Continue to monitor airway  Continuous O2 monitoring  Avoid ACE inhibitors and ARBs.  Do walking test and evaluate status without O2.  Echo to be done.   Acute respiratory failure with hypoxia (HCC)- (present on admission)   Assessment & Plan     CTA negative for PE.   Procalcitonin negative.     Duo nebs  Low threshold for intramuscular epinephrine if the patient's airway starts to swell, Low threshold for transfer to ICU.   Hypertension- (present on admission)   Assessment & Plan     Continue to monitor  Avoid ACE Inhibitors or ARBs        Prophylactic measure

## 2019-10-24 ENCOUNTER — OFFICE VISIT (OUTPATIENT)
Dept: MEDICAL GROUP | Facility: MEDICAL CENTER | Age: 59
End: 2019-10-24
Attending: FAMILY MEDICINE
Payer: MEDICAID

## 2019-10-24 ENCOUNTER — TELEPHONE (OUTPATIENT)
Dept: MEDICAL GROUP | Facility: MEDICAL CENTER | Age: 59
End: 2019-10-24

## 2019-10-24 VITALS
HEIGHT: 73 IN | WEIGHT: 274 LBS | TEMPERATURE: 97.1 F | OXYGEN SATURATION: 96 % | RESPIRATION RATE: 16 BRPM | BODY MASS INDEX: 36.31 KG/M2 | SYSTOLIC BLOOD PRESSURE: 122 MMHG | HEART RATE: 69 BPM | DIASTOLIC BLOOD PRESSURE: 78 MMHG

## 2019-10-24 DIAGNOSIS — E66.9 OBESITY (BMI 30-39.9): ICD-10-CM

## 2019-10-24 DIAGNOSIS — I10 ESSENTIAL HYPERTENSION: ICD-10-CM

## 2019-10-24 DIAGNOSIS — Z80.0 FAMILY HISTORY OF COLON CANCER: ICD-10-CM

## 2019-10-24 PROCEDURE — 99212 OFFICE O/P EST SF 10 MIN: CPT | Performed by: FAMILY MEDICINE

## 2019-10-24 PROCEDURE — 99203 OFFICE O/P NEW LOW 30 MIN: CPT | Performed by: FAMILY MEDICINE

## 2019-10-24 RX ORDER — HYDROCHLOROTHIAZIDE 12.5 MG/1
12.5 TABLET ORAL
Refills: 4 | COMMUNITY
Start: 2019-10-10 | End: 2020-02-10

## 2019-10-24 NOTE — TELEPHONE ENCOUNTER
1. Name: Asad Estevez  Call Back Number: [phone] (home)     Patient approves a detailed voicemail message: yes    Patient called, wanted to let Dr Khan know that the blood pressure medication is has is Hydrochlorothiazide 12.5mg, once daily.    Also patient stated his my chat says he is over due on immunizations and he would like to talk to the medical assistant about it.  Please call back.

## 2019-10-24 NOTE — PROGRESS NOTES
Chief Complaint   Patient presents with   • Establish Care   • Hypertension         HISTORY OF THE PRESENT ILLNESS: Patient is a 59 y.o. male. This pleasant patient is here today to transfer care and discuss the problems below      Hypertension  Patient presents to transfer care.  He had been taking care of about 6 months ago at [facility].  Reports that he had been placed on a combination medication lisinopril/HCTZ.  Within 10 days he developed respiratory failure, along with angioedema causing him to be hospitalized for several days and [facility].  The lisinopril was discontinued and he was advised to avoid all ACE inhibitors.  Sounds like he was continued on hydrochlorothiazide but he is not certain of his dose.  Blood pressures have been controlled recently.  Patient denies current chest pain, chest pressure or palpitations.    Obesity  Patient has been using dietary restriction to lose weight and currently has lost approximately 30 pounds in the past 4 to 5 months.  He is not reporting any hair loss or cold intolerance.  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 30 Cap 0   • benzonatate (TESSALON) 100 MG Cap Take 1 Cap by mouth 3 times a day as needed for Cough. 30 Cap 0     No current Epic-ordered facility-administered medications on file.        Past Medical History:   Diagnosis Date   • Hypertension    • Obesity        Past Surgical History:   Procedure Laterality Date   • OTHER ABDOMINAL SURGERY Right     hernia repair       Social History     Tobacco Use   • Smoking status: Former Smoker     Packs/day: 0.50     Years: 25.00     Pack years: 12.50     Types: Cigarettes   • Smokeless tobacco: Never Used   •  "Tobacco comment: 15 yrs. 1/2 ppd   Substance Use Topics   • Alcohol use: Not Currently     Comment: few beers a week   • Drug use: No       Family Status   Relation Name Status   • Bro  (Not Specified)   • Bro  (Not Specified)   • Neg Hx  (Not Specified)     Family History   Problem Relation Age of Onset   • Cancer Brother         Throat   • Cancer Brother         colon   • Diabetes Neg Hx    • Heart Disease Neg Hx    • Stroke Neg Hx        Review of Systems   Constitutional: Negative for fever, chills, weight loss and malaise/fatigue.   HENT: Negative for ear pain, nosebleeds, congestion, sore throat and neck pain.    Eyes: Negative for blurred vision.   Respiratory: Negative for cough, sputum production, shortness of breath and wheezing.    Cardiovascular: Negative for chest pain, palpitations, orthopnea and leg swelling.   Gastrointestinal: Negative for heartburn, nausea, vomiting and abdominal pain.   Genitourinary: Negative for dysuria, urgency and frequency.   Musculoskeletal: Negative for myalgias, back pain and joint pain.   Skin: Negative for rash and itching.   Neurological: Negative for dizziness, tingling, tremors, sensory change, focal weakness and headaches.   Endo/Heme/Allergies: Does not bruise/bleed easily.   Psychiatric/Behavioral: Negative for depression, anxiety, or memory loss.            Exam: /78   Pulse 69   Temp 36.2 °C (97.1 °F) (Temporal)   Resp 16   Ht 1.854 m (6' 1\")   Wt 124.3 kg (274 lb)   SpO2 96%   General: Normal appearing. No distress.  HEENT: Normocephalic. Eyes conjunctiva clear lids without ptosis, pupils equal and reactive to light accommodation, ears normal shape and contour, canals are clear bilaterally, tympanic membranes are benign, nasal mucosa benign, oropharynx is without erythema, edema or exudates.   Neck: Supple without JVD or bruit. Thyroid is not enlarged.  Pulmonary: Clear to ausculation.  Normal effort. No rales, ronchi, or wheezing.  Cardiovascular: " Regular rate and rhythm without murmur. Carotid and radial pulses are intact and equal bilaterally.  Abdomen: Soft, nontender, nondistended. Normal bowel sounds. Liver and spleen are not palpable  Neurologic: Intact light touch and strength bilaterally,normal speech, no tremor, normal gait.   Lymph: No cervical, supraclavicular or axillary lymph nodes are palpable  Skin: Warm and dry.  No obvious lesions.  Musculoskeletal: Normal gait. No extremity cyanosis, clubbing, or edema.  Psych: Normal mood and affect. Alert and oriented x3. Judgment and insight is normal.    Please note that this dictation was created using voice recognition software. I have made every reasonable attempt to correct obvious errors, but I expect that there are errors of grammar and possibly content that I did not discover before finalizing the note.      Assessment/Plan  1. Obesity (BMI 30-39.9)     2. Essential hypertension  Comp Metabolic Panel    CBC WITH DIFFERENTIAL    FERRITIN   3. Family history of colon cancer  REFERRAL TO GASTROENTEROLOGY     Plan: 1.  GI referral for screening colonoscopy-positive family history of colon cancer in his brother  2.  Check CBC, CMP  3.  Patient will send us a message with his current dosage strength of hydrochlorothiazide-we will need new Rx  4.  Recheck in 6 weeks  5.  Patient is encouraged to continue to limit portions  6.  Addendum 11/1/2019-patient reports he is taking 12.5 mg of HCTZ

## 2019-10-24 NOTE — ASSESSMENT & PLAN NOTE
Patient presents to transfer care.  He had been taking care of about 6 months ago at [facility].  Reports that he had been placed on a combination medication lisinopril/HCTZ.  Within 10 days he developed respiratory failure, along with angioedema causing him to be hospitalized for several days and [facility].  The lisinopril was discontinued and he was advised to avoid all ACE inhibitors.  Sounds like he was continued on hydrochlorothiazide but he is not certain of his dose.  Blood pressures have been controlled recently.  Patient denies current chest pain, chest pressure or palpitations.

## 2019-11-01 NOTE — TELEPHONE ENCOUNTER
Hydrochlorothiazide 12.5 mg medication is now documented in patient's chart.  He is also curious about vaccines, chart indicates he may be due for Tdap which we could get at his next visit.  Shingles vaccine not available through his health plan.

## 2019-11-12 ENCOUNTER — PATIENT MESSAGE (OUTPATIENT)
Dept: MEDICAL GROUP | Facility: MEDICAL CENTER | Age: 59
End: 2019-11-12

## 2019-11-13 ENCOUNTER — TELEPHONE (OUTPATIENT)
Dept: MEDICAL GROUP | Facility: MEDICAL CENTER | Age: 59
End: 2019-11-13

## 2019-11-13 ENCOUNTER — NON-PROVIDER VISIT (OUTPATIENT)
Dept: MEDICAL GROUP | Facility: MEDICAL CENTER | Age: 59
End: 2019-11-13
Attending: FAMILY MEDICINE
Payer: MEDICAID

## 2019-11-13 DIAGNOSIS — Z23 NEED FOR VACCINATION: ICD-10-CM

## 2019-11-13 PROCEDURE — 90715 TDAP VACCINE 7 YRS/> IM: CPT

## 2019-11-13 RX ORDER — POLYETHYLENE GLYCOL-3350 AND ELECTROLYTES WITH FLAVOR PACK 240; 5.84; 2.98; 6.72; 22.72 G/278.26G; G/278.26G; G/278.26G; G/278.26G; G/278.26G
POWDER, FOR SOLUTION ORAL
Refills: 0 | COMMUNITY
Start: 2019-11-04

## 2019-11-13 NOTE — PROGRESS NOTES
"Asad Estevez is a 59 y.o. male here for a non-provider visit for:   TDAP    Reason for immunization: Annual Flu Vaccine  Immunization records indicate need for vaccine: Yes  Minimum interval has been met for this vaccine: Yes  ABN completed: Not Indicated    Order and dose verified by: YANY  VIS Dated  02/24/15 was given to patient: Yes  All IAC Questionnaire questions were answered \"No.\"    Patient tolerated injection and no adverse effects were observed or reported: Yes    Pt scheduled for next dose in series: Not Indicated  "

## 2019-11-13 NOTE — TELEPHONE ENCOUNTER
Patient is on the MA Schedule today for TDAP vaccine/injection.    SPECIFIC Action To Be Taken: Orders pending, please sign.

## 2019-12-10 ENCOUNTER — OFFICE VISIT (OUTPATIENT)
Dept: MEDICAL GROUP | Facility: MEDICAL CENTER | Age: 59
End: 2019-12-10
Attending: FAMILY MEDICINE
Payer: MEDICAID

## 2019-12-10 VITALS
SYSTOLIC BLOOD PRESSURE: 126 MMHG | RESPIRATION RATE: 16 BRPM | HEART RATE: 84 BPM | OXYGEN SATURATION: 94 % | BODY MASS INDEX: 35.52 KG/M2 | HEIGHT: 73 IN | DIASTOLIC BLOOD PRESSURE: 72 MMHG | TEMPERATURE: 97.8 F | WEIGHT: 268 LBS

## 2019-12-10 DIAGNOSIS — I10 ESSENTIAL HYPERTENSION: ICD-10-CM

## 2019-12-10 DIAGNOSIS — K63.5 POLYP OF COLON, UNSPECIFIED PART OF COLON, UNSPECIFIED TYPE: ICD-10-CM

## 2019-12-10 PROCEDURE — 99213 OFFICE O/P EST LOW 20 MIN: CPT | Performed by: FAMILY MEDICINE

## 2019-12-10 PROCEDURE — 99212 OFFICE O/P EST SF 10 MIN: CPT | Performed by: FAMILY MEDICINE

## 2019-12-10 NOTE — PROGRESS NOTES
"Subjective:      Asad Estevez is a 59 y.o. male who presents with No chief complaint on file.            HPI 1.  Essential hypertension-patient is compliant taking HCTZ 12.5 mg.  Not reporting any palpitations, chest pain, chest pressure.  He is intolerant of lisinopril and losartan.  Does note brief lightheadedness that clears in about 30 seconds whenever he gets up from a lying supine.  Other times during the day bending over or getting out of a chair does not create any dizziness.  2.  Colon polyps-patient did complete his colonoscopy recently.  3 polyps were removed and pathology is still pending.  He was asked to return for repeat study in 5 years.    ROS negative for bloody stools, double vision, tinnitus       Objective:     /72 (BP Location: Left arm, Patient Position: Sitting, BP Cuff Size: Adult)   Pulse 84   Temp 36.6 °C (97.8 °F) (Temporal)   Resp 16   Ht 1.854 m (6' 0.99\")   Wt 121.6 kg (268 lb)   SpO2 94%   BMI 35.37 kg/m²      Physical Exam  Gen.- alert, cooperative, in no acute distress  Neck- midline trachea, thyroid not enlarged or tender,supple, no cervical adenopathy  Chest-clear to auscultation and percussion with normal breath sounds. No retractions. Chest wall nontender  Cardiac- regular rhythm and rate. No murmur, thrill, or heave            Assessment/Plan:       1. Essential hypertension      2. Polyp of colon, unspecified part of colon, unspecified type    Plan: 1.  Patient may temporarily suspend HCTZ 12.5 mg  2.  Patient will monitor home blood pressures if they rise above 140/90 will reinstitute medication otherwise we will discuss blood pressures at next visit in 1 month  3.  Patient will obtain previous ordered labs in the next 30 days  "

## 2020-01-17 ENCOUNTER — OFFICE VISIT (OUTPATIENT)
Dept: MEDICAL GROUP | Facility: MEDICAL CENTER | Age: 60
End: 2020-01-17
Attending: FAMILY MEDICINE
Payer: MEDICAID

## 2020-01-17 VITALS
HEIGHT: 73 IN | DIASTOLIC BLOOD PRESSURE: 66 MMHG | WEIGHT: 272 LBS | SYSTOLIC BLOOD PRESSURE: 118 MMHG | RESPIRATION RATE: 16 BRPM | TEMPERATURE: 97.9 F | BODY MASS INDEX: 36.05 KG/M2 | OXYGEN SATURATION: 92 % | HEART RATE: 64 BPM

## 2020-01-17 DIAGNOSIS — I10 ESSENTIAL HYPERTENSION: ICD-10-CM

## 2020-01-17 DIAGNOSIS — M77.8 RIGHT HAND TENDONITIS: ICD-10-CM

## 2020-01-17 DIAGNOSIS — N64.4 NIPPLE PAIN: ICD-10-CM

## 2020-01-17 PROCEDURE — 99212 OFFICE O/P EST SF 10 MIN: CPT | Performed by: FAMILY MEDICINE

## 2020-01-17 PROCEDURE — 99213 OFFICE O/P EST LOW 20 MIN: CPT | Performed by: FAMILY MEDICINE

## 2020-01-17 NOTE — PROGRESS NOTES
"Subjective:      Asad Estevez is a 59 y.o. male who presents with Hypertension            HPI 1.  Left nipple tenderness-patient reports tenderness in his left nipple over the past 10 to 11 days.  There is been no redness or swelling.  He denies any pain in his shoulder or armpit area.  He denies any drainage.  2.  Right hand pain-patient reports also about 11-day history of difficulty fully flexing his long and ring finger on the right hand down to his palm.  He will feel a sensation of tightness along the volar aspect of those 2 fingers across his palm out to the wrist.  There is been no associated local redness warmth or swelling.  He denies any recent overuse or trauma.  Left hand is not similarly affected.  3.  Essential hypertension-patient reports the blood pressure zeeshan to low 140s over low 90s within 5 days of discontinuing the 12.5 mg of HCTZ.  He went ahead and restarted that medication at day 10 and within 5 days blood pressures have been consistently in the 130s over mid to upper 80s.  Patient denies any polyuria or chest pain    ROS negative for chest pressure, palpitations, cough, shortness of breath       Objective:     /66 (BP Location: Right arm, Patient Position: Sitting, BP Cuff Size: Large adult)   Pulse 64   Temp 36.6 °C (97.9 °F) (Temporal)   Resp 16   Ht 1.854 m (6' 0.99\")   Wt 123.4 kg (272 lb)   SpO2 92%   BMI 35.89 kg/m²      Physical Exam  Gen.- alert, cooperative, in no acute distress  Neck- midline trachea, thyroid not enlarged or tender,supple, no cervical adenopathy  Chest-clear to auscultation and percussion with normal breath sounds. No retractions. Chest wall nontender with exception of slight tenderness just underneath the nipple on the left side.  The nipple is not discolored or swollen and there is no palpable thickening.  Left axilla is nontender to palpation and without nodularity  Cardiac- regular rhythm and rate. No murmur, thrill, or heave  Right " hand-intact light touch.  Stiffness.  Is preventing full flexion of the long and ring finger.  Extension is full.  There is no redness warmth or swelling.  Nontender at the right wrist with full range of motion at the wrist          Assessment/Plan:       1. Nipple pain      2. Right hand tendonitis      3. Essential hypertension    Plan: 1.  May take ibuprofen 800 mg twice daily with food for 5 days 2 try to improve right hand tendinitis  2.  Observe with regard to idiopathic left nipple tenderness  3.  Recheck if symptoms persistent in 1 month otherwise recheck within 6 months regarding hypertension

## 2020-02-10 ENCOUNTER — TELEPHONE (OUTPATIENT)
Dept: MEDICAL GROUP | Facility: MEDICAL CENTER | Age: 60
End: 2020-02-10

## 2020-02-10 ENCOUNTER — PATIENT MESSAGE (OUTPATIENT)
Dept: MEDICAL GROUP | Facility: MEDICAL CENTER | Age: 60
End: 2020-02-10

## 2020-02-10 NOTE — TELEPHONE ENCOUNTER
From: Asad Estevez  To: [facility]  Sent: 2/10/2020 10:08 AM PST  Subject: RE:Med refill    They are telling me that they have no prescription from doctor Wilbert that the one they had last was from my old Doctor ! Please make sure you are sending it to my pharmacy at [facility].  ----- Message -----  From: [facility]  Sent: 2/10/2020 9:50 AM PST  To: Asad EMILIE Estevez  Subject: Med refill  Good morning Asad, I received your voicemail about the refill on your hydrochlorothiazide. Dr. Khan is taking care of that right now- the last rx that was written for this was 10/10/2019 and it had 4 refills so there should be 1 more refill on the existing rx, however Doc is in the process of putting new refills on that medication for you. Please let me know if you have questions, have a good day!

## 2020-06-08 ENCOUNTER — TELEPHONE (OUTPATIENT)
Dept: MEDICAL GROUP | Facility: MEDICAL CENTER | Age: 60
End: 2020-06-08

## 2020-06-08 NOTE — TELEPHONE ENCOUNTER
No obvious tx suggestions until we get a diagnosis. Labs in January looked fine, no sign diabetes. Any new medications added last 10d?   Dr Wang

## 2020-06-08 NOTE — TELEPHONE ENCOUNTER
1. Caller Name: Asad                        Call Back Number: [phone] (home)         How would the patient prefer to be contacted with a response: Phone call OK to leave a detailed message    2. What are the patient's symptoms (location & severity)? Frequent urination, dry mouth, difficulty sleeping,     3. Is this a new symptom Yes    4. When did it start? 2 weeks ago    5. Action taken per Active Symptom Guide: Pt has an appointment scheduled on the 11th, he would like to know if there is something that he can do until he comes in for his appointment    6. Patient agrees to recommended action per Active Symptom Escalation Protocol.

## 2020-06-09 ENCOUNTER — OFFICE VISIT (OUTPATIENT)
Dept: MEDICAL GROUP | Facility: MEDICAL CENTER | Age: 60
End: 2020-06-09
Attending: FAMILY MEDICINE
Payer: MEDICAID

## 2020-06-09 VITALS
OXYGEN SATURATION: 97 % | WEIGHT: 280.9 LBS | RESPIRATION RATE: 16 BRPM | HEIGHT: 73 IN | BODY MASS INDEX: 37.23 KG/M2 | TEMPERATURE: 97.6 F | DIASTOLIC BLOOD PRESSURE: 90 MMHG | SYSTOLIC BLOOD PRESSURE: 150 MMHG | HEART RATE: 108 BPM

## 2020-06-09 DIAGNOSIS — I10 ESSENTIAL HYPERTENSION: ICD-10-CM

## 2020-06-09 DIAGNOSIS — E11.65 UNCONTROLLED TYPE 2 DIABETES MELLITUS WITH HYPERGLYCEMIA, WITHOUT LONG-TERM CURRENT USE OF INSULIN (HCC): ICD-10-CM

## 2020-06-09 LAB
HBA1C MFR BLD: 11.5 % (ref 0–5.6)
INT CON NEG: NEGATIVE
INT CON POS: POSITIVE

## 2020-06-09 PROCEDURE — 99213 OFFICE O/P EST LOW 20 MIN: CPT | Performed by: FAMILY MEDICINE

## 2020-06-09 PROCEDURE — 83036 HEMOGLOBIN GLYCOSYLATED A1C: CPT | Performed by: FAMILY MEDICINE

## 2020-06-09 RX ORDER — HYDROCHLOROTHIAZIDE 12.5 MG/1
12.5 TABLET ORAL DAILY
Qty: 30 TAB | Refills: 6 | Status: SHIPPED | OUTPATIENT
Start: 2020-06-09

## 2020-06-09 ASSESSMENT — FIBROSIS 4 INDEX: FIB4 SCORE: 1.09

## 2020-06-10 NOTE — PROGRESS NOTES
"Subjective:      Asad Estevez is a 59 y.o. male who presents with UTI and Dry Mouth            HPI 1.  New onset diabetes mellitus-patient reports over the past 2 weeks he has noted a marked increase in thirst, drinking water, and urination both in frequency and total urinary volume.  Current weight however is up 8 pounds.  Patient did have a laboratory glucose measurement 1/15/2020 which was normal range at 81.    ROS negative for recent fever, dysuria, hematuria, diarrhea       Objective:     /90 (BP Location: Right arm, Patient Position: Sitting, BP Cuff Size: Adult long)   Pulse (!) 108   Temp 36.4 °C (97.6 °F) (Temporal)   Resp 16   Ht 1.854 m (6' 1\")   Wt (!) 127.4 kg (280 lb 14.4 oz)   SpO2 97%   BMI 37.06 kg/m²      Physical Exam  Gen.- alert, cooperative, in no acute distress  Neck- midline trachea, thyroid not enlarged or tender,supple, no cervical adenopathy  Chest-clear to auscultation and percussion with normal breath sounds. No retractions. Chest wall nontender  Cardiac- regular rhythm and rate. No murmur, thrill, or heave  Abdomen- normal bowel sounds, soft without guarding. Liver and spleen not enlarged, no palpable masses or tenderness.   Laboratory: Spot glucose 518, A1c 11.8       Assessment/Plan:       1. Uncontrolled type 2 diabetes mellitus with hyperglycemia, without long-term current use of insulin ([facility])    - Blood Glucose Test Strips; Order  True, meter. Compatible test strips, use 2 X/day number 100 Lancets use 2 X/day number 100  Dispense: 1 Mutually Defined; Refill: 2  - metFORMIN (GLUCOPHAGE) 500 MG Tab; Take 2 Tabs by mouth 2 times a day, with meals.  Dispense: 120 Tab; Refill: 11    2. Essential hypertension    - hydroCHLOROthiazide (HYDRODIURIL) 12.5 MG tablet; Take 1 Tab by mouth every day.  Dispense: 30 Tab; Refill: 6  Plan: 1.  Although patient's current sugar levels are very high, clinically he looks stable and we will initiate treatment on an outpatient " basis-begin metformin 500 mg twice daily x3 days then escalate to 1000 mg twice daily  2.  Essentials of dietary treatment and restrictions were reviewed  3.  Rx for meter, lancets-patient will check sugars before breakfast and before dinner and log those results  4.  Patient will have an MA visit in the next 72 hours to review how to check home glucose  5.  Revisit with me in 5 to 6 days

## 2020-06-11 ENCOUNTER — NON-PROVIDER VISIT (OUTPATIENT)
Dept: MEDICAL GROUP | Facility: MEDICAL CENTER | Age: 60
End: 2020-06-11
Attending: FAMILY MEDICINE
Payer: MEDICAID

## 2020-06-12 ENCOUNTER — TELEPHONE (OUTPATIENT)
Dept: MEDICAL GROUP | Facility: MEDICAL CENTER | Age: 60
End: 2020-06-12

## 2020-06-12 ENCOUNTER — PATIENT MESSAGE (OUTPATIENT)
Dept: MEDICAL GROUP | Facility: MEDICAL CENTER | Age: 60
End: 2020-06-12

## 2020-06-12 DIAGNOSIS — E11.65 UNCONTROLLED TYPE 2 DIABETES MELLITUS WITH HYPERGLYCEMIA, WITHOUT LONG-TERM CURRENT USE OF INSULIN (HCC): ICD-10-CM

## 2020-06-12 NOTE — TELEPHONE ENCOUNTER
1.  Please continue taking the Metformin.  I think the irritability will likely improve as he adapts to this medicine and reducing his sugar levels.  2.  With regard to his desire to have brought diabetes education will set up a referral to the pharmacotherapy service who can  him on all aspects of his diabetes care not just diet alone or medication alone.  He should get a phone call from from them to schedule, that visit will be in our building.

## 2020-06-12 NOTE — TELEPHONE ENCOUNTER
1. Caller Name: Asad Estevez      Call Back Number: [phone] (home)         How would the patient prefer to be contacted with a response: Phone call OK to leave a detailed message    2. What are the patient's symptoms (location & severity)? Blood pressure reading 166 /102, anger and aggression that is unusual for him. States that he is very snappy today which is not normal. Possibly from the metformin. Feeling overall weird.     3. Is this a new symptom Yes    4. When did it start? After starting metformin 06/009    5. Action taken per Active Symptom Guide: will consult with Dr. Ariza and call patient back.     6. Patient agrees to recommended action per Active Symptom Escalation Protocol.

## 2020-06-12 NOTE — TELEPHONE ENCOUNTER
Patient would also like to know if he can get set up with someone to discuss overall diabetes. States he is really confused on what he should be eating and just overall lifestyle change. May be good for him to see israa or dietian.   Please advise.     Thank you,     Hanane Alexander  Medical Assistant

## 2020-06-16 ENCOUNTER — OFFICE VISIT (OUTPATIENT)
Dept: MEDICAL GROUP | Facility: MEDICAL CENTER | Age: 60
End: 2020-06-16
Attending: FAMILY MEDICINE
Payer: MEDICAID

## 2020-06-16 VITALS
TEMPERATURE: 96.6 F | OXYGEN SATURATION: 92 % | HEIGHT: 73 IN | HEART RATE: 88 BPM | BODY MASS INDEX: 36.98 KG/M2 | RESPIRATION RATE: 16 BRPM | WEIGHT: 279 LBS | SYSTOLIC BLOOD PRESSURE: 130 MMHG | DIASTOLIC BLOOD PRESSURE: 72 MMHG

## 2020-06-16 DIAGNOSIS — E11.65 UNCONTROLLED TYPE 2 DIABETES MELLITUS WITH HYPERGLYCEMIA, WITHOUT LONG-TERM CURRENT USE OF INSULIN (HCC): ICD-10-CM

## 2020-06-16 PROCEDURE — 99212 OFFICE O/P EST SF 10 MIN: CPT | Performed by: FAMILY MEDICINE

## 2020-06-16 PROCEDURE — 99213 OFFICE O/P EST LOW 20 MIN: CPT | Performed by: FAMILY MEDICINE

## 2020-06-16 ASSESSMENT — FIBROSIS 4 INDEX: FIB4 SCORE: 1.09

## 2020-06-17 DIAGNOSIS — E11.65 UNCONTROLLED TYPE 2 DIABETES MELLITUS WITH HYPERGLYCEMIA, WITHOUT LONG-TERM CURRENT USE OF INSULIN (HCC): Primary | ICD-10-CM

## 2020-06-17 RX ORDER — DULAGLUTIDE 0.75 MG/.5ML
0.75 INJECTION, SOLUTION SUBCUTANEOUS
Qty: 4 PEN | Refills: 0 | Status: SHIPPED
Start: 2020-06-17 | End: 2020-07-13 | Stop reason: DRUGHIGH

## 2020-06-17 RX ORDER — ERTUGLIFLOZIN 15 MG/1
15 TABLET, FILM COATED ORAL DAILY
Qty: 28 TAB | Refills: 0 | Status: SHIPPED | OUTPATIENT
Start: 2020-06-17 | End: 2020-07-13 | Stop reason: SDUPTHER

## 2020-06-17 NOTE — PROGRESS NOTES
Pt had a referral from  but he is leaving [facility] soon and moving to [place]. He called as he was very frustrated with the fact that he is now diabetic (newly diagnosed and still in pre contemplation stage).    I went over few dietary informations and education on the phone and prescribed some SGL2i (sample) and GLP1 agonist (sample) and metformin to get him by until he can establish care in [place]. These two classes will provide patient with some CVD risk reduction and DKD prevention. He might insulin later but that would require him being in the office with a provider and getting educated on BG level management while on the insulin, so that is not very safe and appropriate at this time.    Pt will stop by the pharmacy to  medications, learn injection technique and get some dietary education flyers.       Pt is checking his FBG and PBG daily and averaging in high 200s - 300.    Pt is leaving [place] in 2 days. He was advised to establish care [facility] and call with any questions if they come up.

## 2020-06-17 NOTE — PROGRESS NOTES
"Subjective:      Asad Estevez is a 59 y.o. male who presents with Diabetes Mellitus            HPI 1.  Uncontrolled type 2 diabetes-patient reports that he is no longer feeling irritable and anxious on current dose of metformin which is 1-1/2 tablets (500 mg) twice daily.  Current sugars are now consistently in the 300-3 80 range (down from over 500 at our last office visit).  Patient denies abdominal pain, chest pain.  He is departing to move to [place] in 3 days    ROS negative for current fever, rash, dyspnea       Objective:     /72 (BP Location: Left arm, Patient Position: Sitting, BP Cuff Size: Large adult)   Pulse 88   Temp 35.9 °C (96.6 °F) (Temporal)   Resp 16   Ht 1.854 m (6' 0.99\")   Wt (!) 126.6 kg (279 lb)   SpO2 92%   BMI 36.82 kg/m²      Physical Exam  Gen.- alert, cooperative, in no acute distress  Neck- midline trachea, thyroid not enlarged or tender,supple, no cervical adenopathy  Chest-clear to auscultation and percussion with normal breath sounds. No retractions. Chest wall nontender  Cardiac- regular rhythm and rate. No murmur, thrill, or heave  Abdomen- normal bowel sounds, soft without guarding. Liver and spleen not enlarged, no palpable masses or tenderness.          Assessment/Plan:       1. Uncontrolled type 2 diabetes mellitus with hyperglycemia, without long-term current use of insulin (HCC)    Plan: 1.  Patient may increase metformin to 1-1/2 in the morning and 2 at bedtime and in 5 additional days go to 2 separate 500 mg tablets twice daily  2.  Basics of dietary selection reviewed  3.  Patient strongly encouraged to immediately set up primary care as he arrives in [place]  "

## 2020-07-01 NOTE — NON-PROVIDER
Asad Estevez is a 59 y.o. male here for a non-provider visit for GLUCOMETER EDUCATION    If abnormal was an in office provider notified today (if so, indicate provider)? Yes  Routed to PCP? No

## 2020-07-13 DIAGNOSIS — E11.65 UNCONTROLLED TYPE 2 DIABETES MELLITUS WITH HYPERGLYCEMIA, WITHOUT LONG-TERM CURRENT USE OF INSULIN (HCC): Primary | ICD-10-CM

## 2020-07-13 RX ORDER — DULAGLUTIDE 1.5 MG/.5ML
1.5 INJECTION, SOLUTION SUBCUTANEOUS
Qty: 4 PEN | Refills: 5 | Status: SHIPPED | OUTPATIENT
Start: 2020-07-13

## 2020-07-13 RX ORDER — ERTUGLIFLOZIN 15 MG/1
15 TABLET, FILM COATED ORAL DAILY
Qty: 30 TAB | Refills: 5 | Status: SHIPPED | OUTPATIENT
Start: 2020-07-13

## 2021-03-15 DIAGNOSIS — Z23 NEED FOR VACCINATION: ICD-10-CM
